# Patient Record
Sex: FEMALE | Race: BLACK OR AFRICAN AMERICAN | Employment: OTHER | URBAN - METROPOLITAN AREA
[De-identification: names, ages, dates, MRNs, and addresses within clinical notes are randomized per-mention and may not be internally consistent; named-entity substitution may affect disease eponyms.]

---

## 2017-01-04 ENCOUNTER — HOSPITAL ENCOUNTER (OUTPATIENT)
Dept: MAMMOGRAPHY | Age: 67
Discharge: HOME | End: 2017-01-04
Attending: OBSTETRICS & GYNECOLOGY
Payer: MEDICARE

## 2017-01-04 DIAGNOSIS — R92.8 ABNORMAL MAMMOGRAM: ICD-10-CM

## 2017-01-04 PROCEDURE — 77065 DX MAMMO INCL CAD UNI: CPT

## 2017-09-14 ENCOUNTER — HOSPITAL ENCOUNTER (OUTPATIENT)
Dept: BONE DENSITY | Age: 67
Discharge: HOME | End: 2017-09-14
Attending: FAMILY MEDICINE
Payer: MEDICARE

## 2017-09-14 DIAGNOSIS — M81.0 OSTEOPOROSIS: ICD-10-CM

## 2017-09-14 PROCEDURE — 77080 DXA BONE DENSITY AXIAL: CPT

## 2017-09-15 ENCOUNTER — OFFICE VISIT (OUTPATIENT)
Dept: SURGERY | Age: 67
End: 2017-09-15

## 2017-09-15 ENCOUNTER — HOSPITAL ENCOUNTER (OUTPATIENT)
Dept: LAB | Age: 67
Discharge: HOME | End: 2017-09-15
Payer: MEDICARE

## 2017-09-15 VITALS
HEIGHT: 61 IN | BODY MASS INDEX: 23.33 KG/M2 | TEMPERATURE: 97.9 F | OXYGEN SATURATION: 96 % | SYSTOLIC BLOOD PRESSURE: 128 MMHG | WEIGHT: 123.6 LBS | HEART RATE: 68 BPM | DIASTOLIC BLOOD PRESSURE: 82 MMHG

## 2017-09-15 DIAGNOSIS — N95.9 MENOPAUSAL AND PERIMENOPAUSAL DISORDER: ICD-10-CM

## 2017-09-15 DIAGNOSIS — N95.2 POSTMENOPAUSAL ATROPHIC VAGINITIS: ICD-10-CM

## 2017-09-15 DIAGNOSIS — Z12.31 ENCOUNTER FOR SCREENING MAMMOGRAM FOR BREAST CANCER: ICD-10-CM

## 2017-09-15 DIAGNOSIS — Z01.419 ENCOUNTER FOR ROUTINE GYNECOLOGICAL EXAMINATION WITH PAPANICOLAOU SMEAR OF CERVIX: Primary | ICD-10-CM

## 2017-09-15 PROCEDURE — 88142 CYTOPATH C/V THIN LAYER: CPT | Performed by: OBSTETRICS & GYNECOLOGY

## 2017-09-15 NOTE — MR AVS SNAPSHOT
Visit Information Date & Time Provider Department Dept. Phone Encounter #  
 9/15/2017 11:15 AM London Garcia, [address] [facility] [address] [id number] Follow-up Instructions Return in about 1 year (around 9/15/2018), or if symptoms worsen or fail to improve. Upcoming Health Maintenance Date Due Hepatitis C Screening 1950 DTaP/Tdap/Td series (1 - Tdap) 12/17/1970 ZOSTER VACCINE AGE 60> 10/17/2009 GLAUCOMA SCREENING Q2Y 12/17/2014 MEDICARE YEARLY EXAM 12/17/2014 FOBT Q 1 YEAR AGE 50-75 9/6/2015 INFLUENZA AGE 9 TO ADULT 8/1/2017 Pneumococcal 65+ Low/Medium Risk (2 of 2 - PPSV23) 8/3/2017 BREAST CANCER SCRN MAMMOGRAM 1/4/2019 Allergies as of 9/15/2017  Review Complete On: 9/15/2017 By: London Garcia MD  
 No Known Allergies Current Immunizations  Reviewed on 8/4/2012 Name Date ZZZ-RETIRED (DO NOT USE) Pneumococcal Vaccine (Unspecified Type) 8/3/2012  5:47 PM  
  
 Not reviewed this visit You Were Diagnosed With   
  
 Codes Comments Encounter for routine gynecological examination with Papanicolaou smear of cervix    -  Primary ICD-10-CM: X22.219 ICD-9-CM: V72.31, V76.2 Menopausal and perimenopausal disorder     ICD-10-CM: N95.9 ICD-9-CM: 627.9 Postmenopausal atrophic vaginitis     ICD-10-CM: N95.2 ICD-9-CM: 627.3 Encounter for screening mammogram for breast cancer     ICD-10-CM: Z12.31 
ICD-9-CM: V76.12 Vitals BP Pulse Temp Height(growth percentile) Weight(growth percentile) SpO2  
 128/82 68 97.9 °F (36.6 °C) (Oral) 5' 1\" (1.549 m) 123 lb 9.6 oz (56.1 kg) 96% BMI OB Status Smoking Status 23.35 kg/m2 Postmenopausal Never Smoker Vitals History BMI and BSA Data Body Mass Index Body Surface Area  
 23.35 kg/m 2 1.55 m 2 Preferred Pharmacy Pharmacy Name Phone  [address] [phone] Your Updated Medication List  
  
   
This list is accurate as of: 9/15/17 12:06 PM.  Always use your most recent med list.  
  
  
  
  
 albuterol 90 mcg/actuation inhaler Commonly known as:  PROVENTIL HFA, VENTOLIN HFA, PROAIR HFA Take 1 Puff by inhalation every four (4) hours as needed. azithromycin 250 mg tablet Commonly known as:  ZITHROMAX Z-JENNIFER  
2 pills on Day #1; 1 pill PO daily on days 2-5.  
  
 benzonatate 100 mg capsule Commonly known as:  TESSALON Take 100 mg by mouth three (3) times daily as needed for Cough. chlorpheniramine-HYDROcodone 10-8 mg/5 mL suspension Commonly known as:  Kaleta Chantale ER Take 5 mL by mouth every twelve (12) hours as needed for Cough. Max Daily Amount: 10 mL. CLARITIN 10 mg tablet Generic drug:  loratadine Take 10 mg by mouth. dextromethorphan-guaiFENesin  mg/5 mL syrup Commonly known as:  ROBITUSSIN-DM Take 10 mL by mouth every six (6) hours as needed for Cough. * fluticasone 50 mcg/actuation nasal spray Commonly known as:  Rosemary Gale * FLOVENT  mcg/actuation inhaler Generic drug:  fluticasone  
  
 hydroCHLOROthiazide 25 mg tablet Commonly known as:  HYDRODIURIL  
  
 LIALDA 1.2 gram delayed release tablet Generic drug:  mesalamine Take 2.4 g by mouth Daily (before breakfast). montelukast 10 mg tablet Commonly known as:  SINGULAIR  
  
 PROzac 20 mg capsule Generic drug:  FLUoxetine Take 20 mg by mouth daily. * Notice: This list has 2 medication(s) that are the same as other medications prescribed for you. Read the directions carefully, and ask your doctor or other care provider to review them with you. We Performed the Following OBTAINING SCREEN PAP SMEAR [ [facility]] PAP, LB, RFX HPV [id number]) [id number] CPT(R)] Follow-up Instructions Return in about 1 year (around 9/15/2018), or if symptoms worsen or fail to improve. To-Do List   
 09/15/2017 Imaging:  [facility] MAMMO BI SCREENING INCL CAD Introducing [facility]! Nayeli Logan introduces myThings patient portal. Now you can access parts of your medical record, email your doctor's office, and request medication refills online. 1. In your internet browser, go to [email or web address] 2. Click on the First Time User? Click Here link in the Sign In box. You will see the New Member Sign Up page. 3. Enter your myThings Access Code exactly as it appears below. You will not need to use this code after youve completed the sign-up process. If you do not sign up before the expiration date, you must request a new code. · myThings Access Code: [email or web address] Expires: 11/22/2017  1:16 PM 
 
4. Enter the last four digits of your Social Security Number (xxxx) and Date of Birth (mm/dd/yyyy) as indicated and click Submit. You will be taken to the next sign-up page. 5. Create a myThings ID. This will be your myThings login ID and cannot be changed, so think of one that is secure and easy to remember. 6. Create a myThings password. You can change your password at any time. 7. Enter your Password Reset Question and Answer. This can be used at a later time if you forget your password. 8. Enter your e-mail address. You will receive e-mail notification when new information is available in [address] 9. Click Sign Up. You can now view and download portions of your medical record. 10. Click the Download Summary menu link to download a portable copy of your medical information. If you have questions, please visit the Frequently Asked Questions section of the myThings website. Remember, myThings is NOT to be used for urgent needs. For medical emergencies, dial 911. Now available from your iPhone and Android! Please provide this summary of care documentation to your next provider. Your primary care clinician is listed as [facility]. If you have any questions after today's visit, please call [phone].

## 2017-09-15 NOTE — PROGRESS NOTES
SUBJECTIVE: Alonzo Sewell is a 78 y.o. female who presents with desire for annual well woman exam. No LMP recorded. Patient is postmenopausal.     No Known Allergies      Past Medical History:   Diagnosis Date    Arthritis     Asthma     Bronchitis     Crohn's colitis ([address]     Gastrointestinal disorder     constipation/hemmorhoids    Hypertension     Ill-defined condition     cerebral palsy    Psychiatric disorder     depression     Past Surgical History:   Procedure Laterality Date    HX ORTHOPAEDIC      R foot surgery    HI COLONOSCOPY W/BIOPSY SINGLE/MULTIPLE  2013         HI COLONOSCOPY W/BIOPSY SINGLE/MULTIPLE  10/9/2014         HI EGD TRANSORAL BIOPSY SINGLE/MULTIPLE  2013          OB History     Grav Para Term  Abortions TAB SAB Ect Mult Living    2 2 1 1 0 0 0 0 0 1        Family History   Problem Relation Age of Onset    Hypertension Mother     Cancer Father      Bone cancer    Hypertension Father     Breast Cancer Maternal Aunt      Social History     Social History    Marital status:      Spouse name: N/A    Number of children: N/A    Years of education: N/A     Occupational History    Not on file. Social History Main Topics    Smoking status: Never Smoker    Smokeless tobacco: Never Used    Alcohol use No      Comment: rare    Drug use: No    Sexual activity: Not Currently     Other Topics Concern    Not on file     Social History Narrative     Current Outpatient Prescriptions   Medication Sig Dispense Refill    loratadine (CLARITIN) 10 mg tablet Take 10 mg by mouth.  benzonatate (TESSALON) 100 mg capsule Take 100 mg by mouth three (3) times daily as needed for Cough.       azithromycin (ZITHROMAX Z-JENNIFER) 250 mg tablet 2 pills on Day #1; 1 pill PO daily on days 2-5. 6 Tab 0    hydroCHLOROthiazide (HYDRODIURIL) 25 mg tablet       montelukast (SINGULAIR) 10 mg tablet       FLOVENT  mcg/actuation inhaler       fluticasone (FLONASE) 50 mcg/actuation nasal spray       chlorpheniramine-HYDROcodone (TUSSIONEX PENNKINETIC ER) 10-8 mg/5 mL suspension Take 5 mL by mouth every twelve (12) hours as needed for Cough. Max Daily Amount: 10 mL. 60 mL 0    albuterol (PROVENTIL HFA, VENTOLIN HFA, PROAIR HFA) 90 mcg/actuation inhaler Take 1 Puff by inhalation every four (4) hours as needed. 1 Inhaler 0    dextromethorphan-guaiFENesin (ROBITUSSIN-DM)  mg/5 mL syrup Take 10 mL by mouth every six (6) hours as needed for Cough. 240 mL 0    Mesalamine (LIALDA) 1.2 gram delayed release tablet Take 2.4 g by mouth Daily (before breakfast).  fluoxetine (PROZAC) 20 mg capsule Take 20 mg by mouth daily. Review of Systems:   Constitutional: No weight change, chills or fever, anorexia, weakness or sleep disturbance . Cardiovascular: No chest pain, shortness of breath, or palpitations . Respiratory: No cough, shortness of breath, hemoptysis, or orthopnea . Neurologic: No syncope, headaches or seizures . Hematologic: No easy bruising or unusual bleeding . Psychiatric: No insomnia, confusion, depression, or anxiety . GI:No nausea and vomiting, diarrhea or constipation  . : See HPI . Musculoskeletal: No joint pain or muscle pain . Endocrine: No polydipsia, polyuria, cold intolerance, excessive fatigue, or sleep disturbance . Integumentary: No breast pain, lumps, nipple discharge, or axillary lumps .     Objective:     Visit Vitals    /82    Pulse 68    Temp 97.9 °F (36.6 °C) (Oral)    Ht 5' 1\" (1.549 m)    Wt 123 lb 9.6 oz (56.1 kg)    SpO2 96%    BMI 23.35 kg/m2       General:  alert, cooperative, no distress, appears stated age   Skin:  no rash or abnormalities   Eyes: negative   Mouth: MMM no lesions   Lymph Nodes:  Cervical, supraclavicular, and axillary nodes normal.   Breast Exam: normal appearance, no masses or tenderness    Lungs:  clear to auscultation bilaterally   Heart:  regular rate and rhythm, S1, S2 normal, no murmur, click, rub or gallop   Abdomen: soft, non-tender. Bowel sounds normal. No masses,  no organomegaly   Back:  Costovertebral angle tenderness absent   Genitourinary: Pelvic exam: VULVA: normal appearing vulva with no masses, tenderness or lesions, VAGINA: normal appearing vagina with normal color and discharge, no lesions, CERVIX: normal appearing cervix without discharge or lesions, UTERUS: uterus is normal size, shape, consistency and nontender, ADNEXA: normal adnexa in size, nontender and no masses    Extremities:  extremities normal, atraumatic, no cyanosis or edema   Neurologic:  negative   Psychiatric:  non focal     ASSESSMENT:      ICD-10-CM ICD-9-CM    1. Encounter for routine gynecological examination with Papanicolaou smear of cervix Z01.419 V72.31 OBTAINING SCREEN PAP SMEAR     V76.2 PAP, LB, RFX HPV CPCLA([id number])   2. Menopausal and perimenopausal disorder N95.9 627.9    3. Postmenopausal atrophic vaginitis N95.2 627.3    4. Encounter for screening mammogram for breast cancer Z12.31 V76.12 [facility] MAMMO BI SCREENING INCL CAD        Follow-up Disposition:  Return in about 1 year (around 9/15/2018), or if symptoms worsen or fail to improve.

## 2017-10-30 ENCOUNTER — HOSPITAL ENCOUNTER (OUTPATIENT)
Dept: CT IMAGING | Age: 67
Discharge: HOME | End: 2017-10-30
Attending: FAMILY MEDICINE
Payer: MEDICARE

## 2017-10-30 DIAGNOSIS — J43.9 EMPHYSEMA LUNG (HCC): ICD-10-CM

## 2017-10-30 PROCEDURE — 71250 CT THORAX DX C-: CPT

## 2018-01-05 ENCOUNTER — HOSPITAL ENCOUNTER (OUTPATIENT)
Dept: MAMMOGRAPHY | Age: 68
Discharge: HOME | End: 2018-01-05
Attending: OBSTETRICS & GYNECOLOGY
Payer: MEDICARE

## 2018-01-05 DIAGNOSIS — Z12.31 ENCOUNTER FOR SCREENING MAMMOGRAM FOR BREAST CANCER: ICD-10-CM

## 2018-01-05 PROCEDURE — 77067 SCR MAMMO BI INCL CAD: CPT

## 2018-09-10 ENCOUNTER — OFFICE VISIT (OUTPATIENT)
Dept: OBGYN CLINIC | Age: 68
End: 2018-09-10

## 2018-09-10 VITALS
WEIGHT: 127 LBS | BODY MASS INDEX: 23.98 KG/M2 | DIASTOLIC BLOOD PRESSURE: 78 MMHG | HEIGHT: 61 IN | SYSTOLIC BLOOD PRESSURE: 120 MMHG

## 2018-09-10 DIAGNOSIS — Z01.419 ENCOUNTER FOR GYNECOLOGICAL EXAMINATION WITHOUT ABNORMAL FINDING: Primary | ICD-10-CM

## 2018-09-10 RX ORDER — PREDNISONE 20 MG/1
TABLET ORAL
COMMUNITY
Start: 2018-08-14 | End: 2019-06-14

## 2018-09-10 NOTE — PROGRESS NOTES
Annual exam    Gi Mercado is a 75 y.o. AAF with pmh of cerebral palsy  A1 presenting for annual exam. Patient without complaint today. Denies PMB. Reports occasional hot flashes, tolerable. Denies issues with vulvovaginal irritation/dryness or other concerns. Denies urinary incontinence. Denies pelvic pain or bloating. Mammogram and colonoscopy wnl and utd. She is taking VitD and Calcium for osteopenia. Pt lives with her  of 25 years. No other concerns today. Ob/Gyn Hx:   A1 - 1 vaginal delivery, 1 SAB  Menopause- unsure  ? VMS- denies  ? Vag dryness- denies  ? HRT- denies  STI- denies  ? SA- no    Health maintenance:  Pap- 17 NILM, no history of abnormal  Mammo- 18 B1  Colonoscopy- , normal repeat in 10 years  Dexa-17, osteopenic    Past Medical History:   Diagnosis Date    Arthritis     Asthma     Bronchitis     Cerebral palsy ([address]     [address]'s [facility] [address] [address]     Gastrointestinal disorder     constipation/hemmorhoids    Hypertension     Ill-defined condition     cerebral palsy    Psychiatric disorder     depression       Past Surgical History:   Procedure Laterality Date    HX ORTHOPAEDIC      R foot surgery    [place] COLONOSCOPY W/BIOPSY SINGLE/MULTIPLE  2013         [place] COLONOSCOPY W/BIOPSY SINGLE/MULTIPLE  10/9/2014         [place] EGD TRANSORAL BIOPSY SINGLE/MULTIPLE  2013            Family History   Problem Relation Age of Onset    Hypertension Mother     Cancer Father      Bone cancer    Hypertension Father     Breast Cancer Maternal Aunt      age 63's       Social History     Social History    Marital status:      Spouse name: N/A    Number of children: N/A    Years of education: N/A     Occupational History    Not on file.      Social History Main Topics    Smoking status: Never Smoker    Smokeless tobacco: Never Used    Alcohol use Yes      Comment: rare    Drug use: No    Sexual activity: Not Currently     Other Topics Concern    Not on file     Social History Narrative       Current Outpatient Prescriptions   Medication Sig Dispense Refill    predniSONE (DELTASONE) 20 mg tablet       hydroCHLOROthiazide (HYDRODIURIL) 25 mg tablet       montelukast (SINGULAIR) 10 mg tablet       Mesalamine (LIALDA) 1.2 gram delayed release tablet Take 2.4 g by mouth Daily (before breakfast).  fluoxetine (PROZAC) 20 mg capsule Take 20 mg by mouth daily.  loratadine (CLARITIN) 10 mg tablet Take 10 mg by mouth.  benzonatate (TESSALON) 100 mg capsule Take 100 mg by mouth three (3) times daily as needed for Cough.  azithromycin (ZITHROMAX Z-JENNIFER) 250 mg tablet 2 pills on Day #1; 1 pill PO daily on days 2-5. 6 Tab 0    FLOVENT  mcg/actuation inhaler       fluticasone (FLONASE) 50 mcg/actuation nasal spray       chlorpheniramine-HYDROcodone (TUSSIONEX PENNKINETIC ER) 10-8 mg/5 mL suspension Take 5 mL by mouth every twelve (12) hours as needed for Cough. Max Daily Amount: 10 mL. 60 mL 0    albuterol (PROVENTIL HFA, VENTOLIN HFA, PROAIR HFA) 90 mcg/actuation inhaler Take 1 Puff by inhalation every four (4) hours as needed. 1 Inhaler 0    dextromethorphan-guaiFENesin (ROBITUSSIN-DM)  mg/5 mL syrup Take 10 mL by mouth every six (6) hours as needed for Cough.  240 mL 0       No Known Allergies    Review of Systems - History obtained from the patient  Constitutional: negative for weight loss, fever, night sweats  HEENT: negative for hearing loss, earache, congestion, snoring, sorethroat  CV: negative for chest pain, palpitations, edema  Resp: negative for cough, shortness of breath, wheezing  GI: negative for change in bowel habits, abdominal pain, black or bloody stools  : negative for frequency, dysuria, hematuria, vaginal discharge  MSK: negative for back pain, joint pain, muscle pain  Breast: negative for breast lumps, nipple discharge, galactorrhea  Skin :negative for itching, rash, hives  Neuro: negative for dizziness, headache, confusion, weakness  Psych: negative for anxiety, depression, change in mood  Heme/lymph: negative for bleeding, bruising, pallor    Physical Exam  Visit Vitals    /78 (BP 1 Location: Left arm, BP Patient Position: Sitting)    Ht 5' 1\" (1.549 m)    Wt 127 lb (57.6 kg)    BMI 24 kg/m2     Constitutional  · Appearance: well-nourished, well developed, alert, in no acute distress, pleasant, ambulates without assistance    HENT  · Head and Face: appears normal, +uses hearing aid    Neck  · Inspection/Palpation: normal appearance, no masses or tenderness  · Lymph Nodes: no lymphadenopathy present  · Thyroid: gland size normal, nontender, no nodules or masses present on palpation    Chest  · Respiratory Effort: non-labored breathing  · Auscultation: CTAB, normal breath sounds    Cardiovascular  · Heart:  · Auscultation: regular rate and rhythm without murmur  · Extremities: no peripheral edema    Breasts  · Inspection of Breasts: breasts symmetrical, no skin changes, no discharge present, nipple appearance normal, no skin retraction present  · Palpation of Breasts and Axillae: no masses present on palpation, no breast tenderness  · Axillary Lymph Nodes: no lymphadenopathy present    Gastrointestinal  · Abdominal Examination: abdomen non-tender to palpation, normal bowel sounds, no masses present  · Liver and spleen: no hepatomegaly present, spleen not palpable  · Hernias: no hernias identified    Genitourinary  · External Genitalia: normal appearance for age, no discharge present, no tenderness present, no inflammatory lesions present, no masses present, +atrophy of UG mucosa  · Vagina: normal vaginal vault without central or paravaginal defects, no discharge present, no inflammatory lesions present, no masses present  · Bladder: non-tender to palpation  · Urethra: appears normal  · Cervix: normal   · Uterus: normal size, shape and consistency, small, mobile  · Adnexa: no adnexal tenderness present, no appreciable adnexal masses present --> exam somewhat limited by habitus  · Perineum: perineum within normal limits, no evidence of trauma, no rashes or skin lesions present    Skin  · General Inspection: no rash, no lesions identified    Neurologic/Psychiatric  · Mental Status:  · Orientation: grossly oriented to person, place and time  · Mood and Affect: mood normal, affect appropriate      Assessment/Plan:  75 y.o. postmenopausal female presenting for annual exam. Overall doing well.      Health Maintenance:  -counseled re: diet, exercise, healthy lifestyle  -pap/HPV no longer indicated d/t age and h/o normal paps  -repeat mammo in 1 year  -colonoscopy wnl and utd  -dexa showing osteopenia --> cont calcium and vitD and weight bearing exercise    RTC: 1 year for AE or sooner torres Petit MD  9/10/2018  10:31 AM

## 2018-09-10 NOTE — PATIENT INSTRUCTIONS
Pelvic Exam: Care Instructions  Your Care Instructions    When your doctor examines all of your pelvic organs, it's called a pelvic exam. Two good reasons to have this kind of exam are to check for sexually transmitted infections (STIs) and to get a Pap test. A Pap test is also called a Pap smear. It checks for early changes that can lead to cancer of the cervix. Sometimes a pelvic exam is part of a regular checkup. In this case, you can do some things to make your test results as accurate as possible. · Try to schedule the exam when you don't have your period. · Don't use douches, tampons, or vaginal medicines, sprays, or powders for 24 hours before your exam.  · Don't have sex for 24 hours before your exam.  Other times, women have this kind of exam at any time of the month. This is because they have pelvic pain, bleeding, or discharge. Or they may have another pelvic problem. Before your exam, it's important to share some information with your doctor. For example, if you are a survivor of rape or sexual abuse, you can talk about any concerns you may have. Your doctor will also want to know if you are pregnant or use birth control. And he or she will want to hear about any problems, surgeries, or procedures you have had in your pelvic area. You will also need to tell your doctor when your last period was. Follow-up care is a key part of your treatment and safety. Be sure to make and go to all appointments, and call your doctor if you are having problems. It's also a good idea to know your test results and keep a list of the medicines you take. How is a pelvic exam done? · During a pelvic exam, you will:  ¨ Take off your clothes below the waist. You will get a paper or cloth cover to put over the lower half of your body. Mar Able on your back on an exam table. Your feet will be raised above you. Stirrups will support your feet. · The doctor will:  Chichi Aid you to relax your knees.  Your knees need to lean out, toward the walls. ¨ Check the opening of your vagina for sores or swelling. ¨ Gently put a tool called a speculum into your vagina. It opens the vagina a little bit. You will feel some pressure. But if you are relaxed, it will not hurt. It lets your doctor see inside the vagina. ¨ Use a small brush, spatula, or swab to get a sample of cells, if you are having a Pap test or culture. The doctor then removes the speculum. ¨ Put on gloves and put one or two fingers of one hand into your vagina. The other hand goes on your lower belly. This lets your doctor feel your pelvic organs. You will probably feel some pressure. Try to stay relaxed. ¨ Put one gloved finger into your rectum and one into your vagina, if needed. This can also help check your pelvic organs. This exam takes about 10 minutes. At the end, you will get a washcloth or tissue to clean your vaginal area. It's normal to have some discharge after this exam. You can then get dressed. Some test results may be ready right away. But results from a culture or a Pap test may take several days or a few weeks. Why should you have a pelvic exam?  · You want to have recommended screening tests. This includes a Pap test.  · You think you have a vaginal infection. Signs include itching, burning, or unusual discharge. · You might have been exposed to a sexually transmitted infection (STI), such as chlamydia or herpes. · You have vaginal bleeding that is not part of your normal menstrual period. · You have pain in your belly or pelvis. · You have been sexually assaulted. A pelvic exam lets your doctor collect evidence and check for STIs. · You are pregnant. · You are having trouble getting pregnant. What are the risks of a pelvic exam?  There are no risks from a pelvic exam.  When should you call for help? Watch closely for changes in your health, and be sure to contact your doctor if you have any problems. Where can you learn more?   Go to http://zainab-sherry.info/. Enter D138 in the search box to learn more about \"Pelvic Exam: Care Instructions. \"  Current as of: October 6, 2017  Content Version: 11.7  © 4501-6427 [employer], [employer]. Care instructions adapted under license by [employer] (which disclaims liability or warranty for this information). If you have questions about a medical condition or this instruction, always ask your healthcare professional. [place] [address] any warranty or liability for your use of this information.

## 2019-01-07 ENCOUNTER — HOSPITAL ENCOUNTER (OUTPATIENT)
Dept: MAMMOGRAPHY | Age: 69
Discharge: HOME | End: 2019-01-07
Attending: FAMILY MEDICINE
Payer: MEDICARE

## 2019-01-07 DIAGNOSIS — Z12.39 ENCOUNTER FOR SCREENING BREAST EXAMINATION: ICD-10-CM

## 2019-01-07 PROCEDURE — 77067 SCR MAMMO BI INCL CAD: CPT

## 2019-06-14 ENCOUNTER — HOSPITAL ENCOUNTER (EMERGENCY)
Age: 69
Discharge: HOME | End: 2019-06-14
Attending: EMERGENCY MEDICINE
Payer: MEDICARE

## 2019-06-14 ENCOUNTER — APPOINTMENT (OUTPATIENT)
Dept: GENERAL RADIOLOGY | Age: 69
End: 2019-06-14
Attending: EMERGENCY MEDICINE
Payer: MEDICARE

## 2019-06-14 VITALS
TEMPERATURE: 97.6 F | RESPIRATION RATE: 20 BRPM | WEIGHT: 125.22 LBS | SYSTOLIC BLOOD PRESSURE: 152 MMHG | BODY MASS INDEX: 23.66 KG/M2 | DIASTOLIC BLOOD PRESSURE: 86 MMHG | HEART RATE: 94 BPM | OXYGEN SATURATION: 96 %

## 2019-06-14 DIAGNOSIS — M54.12 CERVICAL RADICULOPATHY: Primary | ICD-10-CM

## 2019-06-14 PROCEDURE — 99283 EMERGENCY DEPT VISIT LOW MDM: CPT

## 2019-06-14 PROCEDURE — 74011250637 HC RX REV CODE- 250/637: Performed by: EMERGENCY MEDICINE

## 2019-06-14 PROCEDURE — 72050 X-RAY EXAM NECK SPINE 4/5VWS: CPT

## 2019-06-14 PROCEDURE — 74011636637 HC RX REV CODE- 636/637: Performed by: EMERGENCY MEDICINE

## 2019-06-14 RX ORDER — IBUPROFEN 600 MG/1
TABLET ORAL
Status: DISCONTINUED
Start: 2019-06-14 | End: 2019-06-14 | Stop reason: HOSPADM

## 2019-06-14 RX ORDER — PREDNISONE 20 MG/1
TABLET ORAL
Status: DISCONTINUED
Start: 2019-06-14 | End: 2019-06-14 | Stop reason: HOSPADM

## 2019-06-14 RX ORDER — IBUPROFEN 400 MG/1
400 TABLET ORAL
Qty: 30 TAB | Refills: 0 | Status: SHIPPED | OUTPATIENT
Start: 2019-06-14

## 2019-06-14 RX ORDER — PREDNISONE 20 MG/1
60 TABLET ORAL
Status: COMPLETED | OUTPATIENT
Start: 2019-06-14 | End: 2019-06-14

## 2019-06-14 RX ORDER — IBUPROFEN 600 MG/1
600 TABLET ORAL
Status: COMPLETED | OUTPATIENT
Start: 2019-06-14 | End: 2019-06-14

## 2019-06-14 RX ORDER — PREDNISONE 5 MG/1
TABLET ORAL
Qty: 21 TAB | Refills: 0 | Status: SHIPPED | OUTPATIENT
Start: 2019-06-14 | End: 2021-02-18 | Stop reason: CLARIF

## 2019-06-14 RX ADMIN — PREDNISONE 60 MG: 20 TABLET ORAL at 01:59

## 2019-06-14 RX ADMIN — IBUPROFEN 600 MG: 600 TABLET, FILM COATED ORAL at 01:59

## 2019-06-14 NOTE — ED NOTES
0244: Patient verbalizes understanding of discharge instructions given by provider, Dr. Marbin Lee MD. Opportunity for questions provided. Patient in no apparent distress. Patient ambulatory upon discharge.    Visit Vitals  /86 (BP 1 Location: Left arm, BP Patient Position: Sitting)   Pulse 94   Temp 97.6 °F (36.4 °C)   Resp 20   Wt 56.8 kg (125 lb 3.5 oz)   SpO2 96%   BMI 23.66 kg/m²

## 2019-06-14 NOTE — DISCHARGE INSTRUCTIONS
Patient Education        Pinched Nerve in the Neck: Care Instructions  Your Care Instructions  A pinched nerve in the neck happens when a vertebra or disc in the upper part of your spine is damaged. This damage can happen because of an injury. Or it can just happen with age. The changes caused by the damage may put pressure on a nearby nerve root, pinching it. This causes symptoms such as sharp pain in your neck, shoulder, arm, hand, or back. You may also have tingling or numbness. Sometimes it makes your arm weaker. The symptoms are usually worse when you turn your head or strain your neck. For many people, the symptoms get better over time and finally go away. Early treatment usually includes medicines for pain and swelling. Sometimes physical therapy and special exercises may help. Follow-up care is a key part of your treatment and safety. Be sure to make and go to all appointments, and call your doctor if you are having problems. It's also a good idea to know your test results and keep a list of the medicines you take. How can you care for yourself at home? · Be safe with medicines. Read and follow all instructions on the label. ¨ If the doctor gave you a prescription medicine for pain, take it as prescribed. ¨ If you are not taking a prescription pain medicine, ask your doctor if you can take an over-the-counter medicine. · Try using a heating pad on a low or medium setting for 15 to 20 minutes every 2 or 3 hours. Try a warm shower in place of one session with the heating pad. You can also buy single-use heat wraps that last up to 8 hours. · You can also try an ice pack for 10 to 15 minutes every 2 to 3 hours. There isn't strong evidence that either heat or ice will help. But you can try them to see if they help you. · Don't spend too long in one position. Take short breaks to move around and change positions. · Wear a seat belt and shoulder harness when you are in a car.   · Sleep with a pillow under your head and neck that keeps your neck straight. · If you were given a neck brace (cervical collar) to limit neck motion, wear it as instructed for as many days as your doctor tells you to. Do not wear it longer than you were told to. Wearing a brace for too long can lead to neck stiffness and can weaken the neck muscles. · Follow your doctor's instructions for gentle neck-stretching exercises. · Do not smoke. Smoking can slow healing of your discs. If you need help quitting, talk to your doctor about stop-smoking programs and medicines. These can increase your chances of quitting for good. · Avoid strenuous work or exercise until your doctor says it is okay. When should you call for help? Call 911 anytime you think you may need emergency care. For example, call if:  ? · You are unable to move an arm or a leg at all. ?Call your doctor now or seek immediate medical care if:  ? · You have new or worse symptoms in your arms, legs, chest, belly, or buttocks. Symptoms may include:  ¨ Numbness or tingling. ¨ Weakness. ¨ Pain. ? · You lose bladder or bowel control. ? Watch closely for changes in your health, and be sure to contact your doctor if:  ? · You are not getting better as expected. Where can you learn more? Go to http://zainab-sherry.info/. Enter D198 in the search box to learn more about \"Pinched Nerve in the Neck: Care Instructions. \"  Current as of: March 21, 2017  Content Version: 11.5  © 6801-0213 [employer]. Care instructions adapted under license by [employer] (which disclaims liability or warranty for this information). If you have questions about a medical condition or this instruction, always ask your healthcare professional. [address] any warranty or liability for your use of this information.

## 2019-06-14 NOTE — ED TRIAGE NOTES
She has severe pain right arm and shoulder and neck. She has had the pain for several weeks. She was given an injection about 1 week ago that helped briefly. She says the pain is so bad now she cant stand it. She is scheduled for an MRI next week.

## 2019-06-14 NOTE — ED PROVIDER NOTES
70 y.o. female with past medical history significant for HTN, constipation/hemmorhoids, depression, arthritis, chron's colitis, cerebral palsy, bronchitis, and asthma who presents from home via EMS with chief complaint of right sided arm pain. Pt reports right sided arm pain which she describes as \"aching like a toothache. \" Pt reports the pain originates in the right side of her neck and radiates down her right arm. Pt denies any chest pain with and without exertion. Pt denies fever, chills, and chest pain. There are no other acute medical concerns at this time. Social hx:   PCP: Justine Jackson DO    Note written by liset Verma, as dictated by Ya Carrasco MD 1:27 AM      The history is provided by the patient and the spouse. No  was used.         Past Medical History:   Diagnosis Date    Arthritis     Asthma     Bronchitis     Cerebral palsy (HCC)     Crohn's colitis ([facility] [address]     Gastrointestinal disorder     constipation/hemmorhoids    Hypertension     Ill-defined condition     cerebral palsy    Psychiatric disorder     depression       Past Surgical History:   Procedure Laterality Date    HX ORTHOPAEDIC      R foot surgery    [place] COLONOSCOPY W/BIOPSY SINGLE/MULTIPLE  2/22/2013         KS COLONOSCOPY W/BIOPSY SINGLE/MULTIPLE  10/9/2014         KS EGD TRANSORAL BIOPSY SINGLE/MULTIPLE  2/22/2013              Family History:   Problem Relation Age of Onset    Hypertension Mother     Cancer Father         Bone cancer    Hypertension Father     Breast Cancer Maternal Aunt         age 63's   [address] [facility] Breast Cancer Cousin         52's       Social History     Socioeconomic History    Marital status:      Spouse name: Not on file    Number of children: Not on file    Years of education: Not on file    Highest education level: Not on file   Occupational History    Not on file   Social Needs    Financial resource strain: Not on file    Food insecurity:     Worry: Not on file     Inability: Not on file    Transportation needs:     Medical: Not on file     Non-medical: Not on file   Tobacco Use    Smoking status: Never Smoker    Smokeless tobacco: Never Used   Substance and Sexual Activity    Alcohol use: Yes     Comment: rare    Drug use: No    Sexual activity: Not Currently   Lifestyle    Physical activity:     Days per week: Not on file     Minutes per session: Not on file    Stress: Not on file   Relationships    Social connections:     Talks on phone: Not on file     Gets together: Not on file     Attends Yazdanism service: Not on file     Active member of club or organization: Not on file     Attends meetings of clubs or organizations: Not on file     Relationship status: Not on file    Intimate partner violence:     Fear of current or ex partner: Not on file     Emotionally abused: Not on file     Physically abused: Not on file     Forced sexual activity: Not on file   Other Topics Concern    Not on file   Social History Narrative    Not on file         ALLERGIES: Patient has no known allergies. Review of Systems   Constitutional: Negative for chills and fever. Eyes: Negative for visual disturbance. Respiratory: Negative for shortness of breath. Cardiovascular: Negative for chest pain. Gastrointestinal: Negative for abdominal pain, diarrhea, nausea and vomiting. Genitourinary: Negative for dysuria. Musculoskeletal: Positive for myalgias (right sided arm pain) and neck pain (right sided). Vitals:    06/14/19 0111   BP: 152/86   Pulse: 94   Resp: 20   Temp: 97.6 °F (36.4 °C)   SpO2: 96%   Weight: 56.8 kg (125 lb 3.5 oz)            Physical Exam   Constitutional: She is oriented to person, place, and time. She appears well-developed and well-nourished. No distress. NAD, AxOx4, speaking in complete sentences     HENT:   Head: Normocephalic and atraumatic.    Mouth/Throat: Oropharynx is clear and moist.   bilat hearing aides   Eyes: Pupils are equal, round, and reactive to light. Conjunctivae are normal. Right eye exhibits no discharge. No scleral icterus. Neck: Normal range of motion. Neck supple. No JVD present. No tracheal deviation present. Cardiovascular: Normal rate, regular rhythm, normal heart sounds and intact distal pulses. Exam reveals no gallop and no friction rub. No murmur heard. Pulmonary/Chest: Effort normal and breath sounds normal. No respiratory distress. She has no wheezes. She has no rales. She exhibits no tenderness. Abdominal: Soft. Bowel sounds are normal. There is no tenderness. There is no rebound and no guarding. Genitourinary: No vaginal discharge found. Musculoskeletal: Normal range of motion. She exhibits no edema or tenderness. R paraspinal muscular/ ttp;  R = L; pt has distal motor/ CV/ Sensation grossly intact R fingers; Neurological: She is alert and oriented to person, place, and time. She displays normal reflexes. No cranial nerve deficit or sensory deficit. She exhibits normal muscle tone. Coordination normal.   Skin: Skin is warm and dry. Capillary refill takes less than 2 seconds. No rash noted. No erythema. No pallor. Psychiatric: She has a normal mood and affect. Her behavior is normal. Thought content normal.   Nursing note and vitals reviewed. MDM       Procedures      2:20 AM  Ulises Osullivan Anthony's  results have been reviewed with her. She has been counseled regarding her diagnosis. She verbally conveys understanding and agreement of the signs, symptoms, diagnosis, treatment and prognosis and additionally agrees to Call/ Arrange follow up as recommended with Dr. Dilshad Calle DO in 24 - 48 hours. She also agrees with the care-plan and conveys that all of her questions have been answered.   I have also put together some discharge instructions for her that include: 1) educational information regarding their diagnosis, 2) how to care for their diagnosis at home, as well a 3) list of reasons why they would want to return to the ED prior to their follow-up appointment, should their condition change or for concerns.

## 2019-06-20 ENCOUNTER — HOSPITAL ENCOUNTER (OUTPATIENT)
Dept: MRI IMAGING | Age: 69
Discharge: HOME | End: 2019-06-20
Attending: NEUROLOGICAL SURGERY
Payer: MEDICARE

## 2019-06-20 VITALS — BODY MASS INDEX: 22.67 KG/M2 | WEIGHT: 120 LBS

## 2019-06-20 DIAGNOSIS — M54.12 CERVICAL RADICULOPATHY: ICD-10-CM

## 2019-06-20 PROCEDURE — A9575 INJ GADOTERATE MEGLUMI 0.1ML: HCPCS | Performed by: NEUROLOGICAL SURGERY

## 2019-06-20 PROCEDURE — 72156 MRI NECK SPINE W/O & W/DYE: CPT

## 2019-06-20 PROCEDURE — 74011250636 HC RX REV CODE- 250/636: Performed by: NEUROLOGICAL SURGERY

## 2019-06-20 RX ORDER — GADOTERATE MEGLUMINE 376.9 MG/ML
10 INJECTION INTRAVENOUS
Status: COMPLETED | OUTPATIENT
Start: 2019-06-20 | End: 2019-06-20

## 2019-06-20 RX ADMIN — GADOTERATE MEGLUMINE 10 ML: 376.9 INJECTION INTRAVENOUS at 19:04

## 2019-08-16 ENCOUNTER — TELEPHONE (OUTPATIENT)
Dept: RHEUMATOLOGY | Age: 69
End: 2019-08-16

## 2019-08-16 NOTE — TELEPHONE ENCOUNTER
Return call back to patient requesting if we had received referral information for her upcoming npt appt on 9/4/2019 at 1:00. Explain to patient we had not received anything she may want to reach out to her pcp office.  sdh

## 2019-09-03 ENCOUNTER — TELEPHONE (OUTPATIENT)
Dept: RHEUMATOLOGY | Age: 69
End: 2019-09-03

## 2019-09-03 NOTE — TELEPHONE ENCOUNTER
I called and but could not confirm with the patient on 9/3/2019 for their next day 9/4/2019 appointment but left a voice message to call back. SDH.

## 2019-09-04 ENCOUNTER — OFFICE VISIT (OUTPATIENT)
Dept: RHEUMATOLOGY | Age: 69
End: 2019-09-04

## 2019-09-04 VITALS
DIASTOLIC BLOOD PRESSURE: 90 MMHG | TEMPERATURE: 97.7 F | HEIGHT: 61 IN | WEIGHT: 119 LBS | BODY MASS INDEX: 22.47 KG/M2 | RESPIRATION RATE: 18 BRPM | HEART RATE: 92 BPM | SYSTOLIC BLOOD PRESSURE: 150 MMHG

## 2019-09-04 DIAGNOSIS — F09 COGNITIVE DYSFUNCTION: ICD-10-CM

## 2019-09-04 DIAGNOSIS — M54.2 CHRONIC NECK PAIN: ICD-10-CM

## 2019-09-04 DIAGNOSIS — K52.9 COLITIS: ICD-10-CM

## 2019-09-04 DIAGNOSIS — M25.431 WRIST SWELLING, RIGHT: Primary | ICD-10-CM

## 2019-09-04 DIAGNOSIS — G89.29 CHRONIC NECK PAIN: ICD-10-CM

## 2019-09-04 NOTE — PROGRESS NOTES
REASON FOR VISIT    This is the initial evaluation for Ms. Art Mcmullen a 70 y.o.  female for question of an inflammatory arthritis. The patient is referred to the [facility] at the request of Dr. Juancarlos Valdez. HISTORY OF PRESENT ILLNESS      I have reviewed and summarized old records from [facility], [facility] records, [facility]. In 2/22/2013, Midesophagus, biopsy: Normal squamous epithelium; fungal stain negative 2. Left colon, biopsy: Moderate chronic active colitis. Histologic features suggest inflammatory bowel disease. There is abundant detached purulent exudate but not the classic mucopurulent exudate or mucosal necrosis of pseudomembranous colitis due to C. Difficile. In 10/09/2014, Sigmoid colon, biopsy: Mild chronic active colitis    In 5/19/2015, MRI Cervical Spine with and without contrast showed There is reversal of curvature centered at C5. No significant listhesis. There is erosive change of the odontoid process. Mild edema is present within the odontoid process with slight postcontrast enhancement. There is extensive pannus formation along the posterior aspect of the odontoid process. This creates severe spinal stenosis with the C1 ring. The spinal cord is mildly narrowed in this region with increased signal likely related to chronic myelomalacia. There is degenerative wedging of the C3-C7 vertebral bodies. There is mild degenerative edema along the endplates. Mild disc space narrowing is present at C2-3. Moderate disc space narrowing is present from C3-C7. Moderate disc space narrowing is in the superior thoracic spine. Theremainder of the spinal cord is unremarkable. There is no enhancement within the cord. C2-C3:  No herniation or stenosis. C3-C4:  Minimal broad-based disc osteophyte complex without significant spinal stenosis. There is moderate left and mild right neural frontal narrowing submitted to uncal hypertrophy.  C4-C5:  Mild broad-based disc osteophyte complex with thickening of ligamentum flavum causing moderate spinal stenosis. There is mild bilateral neural foraminal narrowing secondary to uncal and facet hypertrophy. C5-C6:  Mild broad-based disc osteophyte complex with thickening of ligamentum flavum causing moderate spinal stenosis. There is moderate bilateral neural foraminal narrowing secondary to uncal hypertrophy. C6-C7:  Mild broad-based discussed you by complex with thickening of ligamentum flavum causing moderate spinal stenosis. There is moderate bilateral neural foraminal narrowing secondary to uncal hypertrophy. C7-T1:  No significant spinal stenosis. Moderate left and right neural foraminal narrowing. Mild broad-based disc osteophyte complexes are present from T1-T4 causing mild spinal stenosis. There is moderate bilateral neural foraminal narrowing at these levels as well. In 12/8/2015, EMG/NCS showed severe right carpal tunnel syndrome affecting motor and sensory fibers. Left carpal tunnel syndrome with severe sensory involvement and moderate motor involvement. 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 No evidence of radiculopathies involving the left upper and lower extremity motor axons. No evidence of myositis or myopathy. In 5/20/2019, CT Cervical Spine without contrast showed The base of the dens there are well circumscribed erosive changes. Additional similar findings are noted within the lateral mass of L1 on the left. There is no significant calcification within the prominent soft tissue posterior to the dens. Bone mineral density is normal. There is extensive multilevel degenerative change. An acute fracture is not identified.  However again demonstrated is significant narrowing of the canal posterior to the dens predominantly due to the hypertrophied soft tissue     In 10/26/2015, she saw my colleague, Dr. Mae Vidales, rheumatology. Bilateral Hand radiograph showed moderate diffuse osteopenia. There is mild osteoarthrosis of the first carpometacarpal joints bilaterally as well as the first and fifth metacarpophalangeal joints bilaterally. Similar findings are shown in the right thumb interphalangeal joint and the right third through fifth and left second through fifth distal interphalangeal joints. No osseous erosions or soft tissue calcifications are shown. There is mild nonspecific flexion of the right fourth and fifth proximal and distal interphalangeal joints. The soft tissues are within normal limits. Bilateral Hip radiograph showed numerous oval-shaped flocculent densities overlying the left hip and proximal thigh, each measuring approximately 4.0 x 2.3 cm in size. These are not shown on previous radiographs and probably extrinsic to the patient. This should be confirmed clinically with repeat views obtained without these artifacts present. The bones are moderately osteopenic. No acute fracture or dislocation is shown. Mild to moderate degenerative changes in the lower lumbar spine are shown as are minimal degenerative changes in the SI joints and hips bilaterally. No osseous erosions are shown. Bilateral Foot radiograph showed moderate diffuse osteopenia. No other bone, joint or soft tissue abnormality is shown. .  There is no effusion. Bilateral Foot radiograph showed moderate diffuse osteopenia. There is deformity of the first metatarsals bilaterally with valgus of the distal portion of the metatarsal on the right. The deformities probably postoperative nature and should be correlated clinically. Severe osteoarthrosis of the first metatarsophalangeal joint on the right is shown with medial subluxation of the proximal phalanx as well as mild varus of the digit. 20 is greater laterally and there is attrition of bone substance at the lateral base of the first proximal phalanx.  There is mild osteoarthrosis of the contralateral left first MTP joint. There have been arthroplasties of the right second and fifth proximal interphalangeal joint and probably of the left fifth middle interphalangeal joint. Mild nonuniform joint space narrowing consistent with osteoarthrosis is shown at the right fifth MTP joint, right first IP joint, and several digital interphalangeal joints bilaterally. There is no substantial midfoot arthrosis nor substantial hydronephrosis demonstrated. Hindfoot and midfoot alignment is anatomic. There are tiny plantar and dorsal calcaneal spurs bilaterally. In 10/26/2015, labs showed VANESSA 1:1280 (homogenous), negative rheumatoid factor, anti-CCP    In 11/13/2015, labs showed positive anti-histone 5.3, negative anti-RNP, anti-dsDNA Ab, anti-Sm, anti-Ro, anti-La, anti-TPO, anti-thyroglobulin, myositis panel, normal C3, C4    In 12/16/2015, Thoracic Spine radiograph showed no acute fracture or compression deformity. Subtle leftward curvature of the midthoracic spine. Subtle rightward curvature of the upper and lower thoracic spine. No anterior or posterior subluxation. Degenerative disc disease is moderate in the upper thoracic spine. Severe degenerative disc disease in the lower cervical spine. No evidence of ankylosis. Lumbar Spine radiograph showed Subtle rightward curvature of the upper lumbar spine. No anterior or posterior subluxation. Degenerative disc disease is greatest at L5-S1. Facet arthrosis is moderate. No acute fracture or compression deformity. No ankylosis. Sacroiliac joints are age-appropriate. Sacroiliac Joint radiograph showed No acute fracture or dislocation. Minimal age-appropriate bilateral sacroiliac joint osteoarthritis. No evidence of erosion. Degenerative disease in the lumbar spine is partially imaged.     In 12/22/2015, Cervical Spine radiograph showed The patient has undergone posterior fixation  at C1-2, with a screw within the right C1 lateral mass and within the lateral masses of C2 bilaterally. There may have been surgical removal of the C1 posterior arch. Flexion and extension views demonstrate no malalignment or instability. Diffuse degenerative changes are present throughout the cervical spine, with multilevel spondylosis and facet hypertrophy. In 1/08/2016, CT Cervical Spine without contrast showed patient is post resection of posterior arch of C1. There has been posterior fusion of C1 and C2. C1 lateral mass screw extends into the superior margin of the right C1-C2 foramen. The bilateral C2 screws traverse the C1-C2  articulation. There is bone graft material posterior laterally on the right. This is fused with the posterior arch of C2. It does not fuse with the posterior arch of C1. There is persistent hypertrophy of the soft tissues posterior to the dens, it may have decreased slightly. Degeneration of the remaining cervical disc levels unchanged most significant at C5-6 and C6-7. No acute fracture. In 1/21/2016, MRI Pelvis with and without contrast showed There is extensively demonstrated abnormal signal within the sacral wings bilaterally with enhancement and vertically oriented fracture lines. A transverse component of abnormal signal is also shown at the S2 vertebral segment. The findings are consistent with sacral insufficiency fracture. There is otherwise normal bone signal and enhancement. Minimal osteoarthrosis of both sacroiliac joints is shown without demonstration of joint effusion, erosion, or joint space-centered abnormal enhancement. Minimal bilateral SI joint capsular enhancement is noted, however. There is no bone or soft tissue mass. Muscle contour, signal, morphology and enhancement appear normal. No tendon disruption is shown. In 3/25/2016, labs showed positive VANESSA 1:1280 (homogenous), anti-histone 7.2, negative HLA-B27    In 4/25/2016, MRI Cervical Spine without contrast showed New hardware within C1 and C2 causes metal artifact.  Decreased soft tissue pannus dorsal to the odontoid process. Resection of the posterior arch of C1 since last year. Posterior subluxation of C5-6 and C6-7 is unchanged. Chronic volume loss of C5 and C6 is unchanged. No acute compression fracture. Bone marrow edema in the odontoid process is unchanged. Disc desiccation is unchanged. No evidence of discitis. The craniocervical junction is now within normal limits. No cord compression at C1 or C2 anymore. Mild cord compression at C5-C6 is unchanged. No cord signal abnormality within the limitation of motion artifact. No cord expansion or syrinx. The paraspinal soft tissues are within normal limits. C2-C3:  No herniation or stenosis. C3-C4: Limited by motion artifact. Bilateral foraminal stenosis is likely unchanged. C4-C5:  Limited by motion artifact. Mild central spinal canal stenosis is unchanged. Right foraminal stenosis is at least moderate. C5-C6:  Posterior disc osteophyte complex. Moderate central spinal canal stenosis. At least moderate bilateral foraminal stenosis. No significant change. C6-C7: Posterior disc osteophyte complex and facet arthrosis. Moderate central spinal canal stenosis. At least moderate bilateral foraminal stenosis. No significant change. C7-T1:  No herniation or stenosis. Thoracic spine degenerative disease and multilevel stenoses are unchanged. In 9/01/2016, CT Spine Cervical without contrast The patient has undergone prior resection of the posterior arch of C1 as well as posterior fusion at C1-2. Hardware position is unchanged and hardware is intact. Extension of the right C1 lateral mass screw through the superior aspect of the C1-2 foramen is unchanged. There is unchanged bone graft fusion to the posterior arch of C2 but not to C1. Hypertrophic changes along the dorsal aspect of the odontoid process are unchanged. The kyphotic deformity of the cervical spine is unchanged. There is diffuse sclerosis of the C6 vertebra which is likely degenerative in nature. Chronic loss of height of C5-C7 is unchanged. The prevertebral soft tissues are within normal limits. C2-C3: There is no spinal stenosis. C3-C4:  There is bilateral facet hypertrophy, right greater than left, with mild right foraminal stenosis. There is no central spinal stenosis. C4-C5:  There is marked right facet hypertrophy with right foraminal stenosis. There is no moderate central stenosis. The spinal canal was previously better evaluated on MRI. C5-C6:  There is mild posterior disc osteophyte complex and bilateral uncovertebral joint hypertrophy with moderate central stenosis. There is mild bilateral foraminal stenosis. C6-C7:  There is a mild posterior disc osteophyte complex and bilateral uncovertebral joint hypertrophy. There is moderate central spinal stenosis, better demonstrated on a previous MRI. Suezanne Kassandra There is at least mild bilateral foraminal stenosis. Cystic change within the posterior aspect of C6 is likely degenerative. C7-T1:  There is no spinal canal or neural foraminal stenosis. In 9/14/2017, DXA showed (excluded L3 and L4 due to spondylosis, right hip) lumbar spine L1-L2 T score -2.4 (BMD 0.808 g/cm2), left femoral neck T score: -2.1 (0.650 g/cm2), left total hip T score: -1.8 (0.756 g/cm2), and distal one third left radius T score -1.9 (BMD 0.572 g/cm2). FRAX score 7.0 % probability in 10 years for major osteoporotic fracture and 1.1 % 10 year probability of hip fracture. In 10/30/2017, CT Chest without contrast showed THYROID: No nodule. MEDIASTINUM: No mass or lymphadenopathy. GEORGINA: No mass or lymphadenopathy. THORACIC AORTA: No aneurysm. MAIN PULMONARY ARTERY: Normal in caliber. TRACHEA/BRONCHI: Patent. ESOPHAGUS: No wall thickening or dilatation. HEART: Normal in size. PLEURA: No effusion or pneumothorax. LUNGS: No nodule, mass, or airspace disease. Study is slightly compromised by motion. There is question of a 2 mm right lower lobe pulmonary nodule.  INCIDENTALLY IMAGED UPPER ABDOMEN: No focal abnormality. BONES: No destructive bone lesion. There are degenerative changes lower cervical spine. There are mild degenerative changes upper and mid thoracic spine. In 6/20/2019, MRI Cervical Spine with and without contrast showed There is accentuated reversal of the normal lordosis of the cervical spine with fairly marked kyphosis at the level of C5-6. No subluxation of vertebral bodies noted. There is marked progression of degenerative disc disease changes and spondylosis at C5-6. There is no loss of vertebral body height. Marrow signal intensity at C5 and C6 demonstrates progressive degenerative changes. There is no evidence of discitis or osteomyelitis. The craniocervical junction is intact. Note is made of patient's history of previous posterior laminectomy at C1-2. Fixation screws are seen bilaterally. This is similar to the previous study. There is no encroachment on the neural canal at this level. No abnormal enhancement is identified at this level. Motion artifact does degrade evaluation of the intrinsic signal of the cord. No definite pathologic enhancement is identified, however motion artifact is most severe on the postcontrast images. This does degrade detail significantly. No gross abnormality is identified in the paraspinal soft tissues within limits of the motion artifact. C2-C3: No herniation or stenosis. C3-C4: No herniation or stenosis. C4-C5: There has been progressive degenerative changes at this level with loss of disc height and progressive spondylosis with bulging of the disc on the left. Narrowing of the left neural foramen secondary to this is suspected. There is also left sided narrowing of the neural canal slight flattening of the cord. C5-C6: Progressive degenerative changes noted at this level with narrowing of the canal particularly laterally.  Axial imaging is markedly degraded by motion artifact but at least mild compression of the cord laterally on the left and the right suspected at this level. On the sagittal views the AP diameter of the canal at the disc level is 6.2 mm. Previously at this level AP diameter was 7.8 mm. C6-C7: Degenerative disc disease change is again noted at this level with some progression. There is no significant central stenosis. C7-T1: No herniation or stenosis. Today, she complains of neck and shoulder pain. She follows with Dr. Penny Ordonez, neurosurgery. She is doing physical therapy. She has numbness in her hands. She denies joint pain, swelling, or stiffness in her peripheral joints. She is currently on mesalamine 2.4 g daily. She is scheduled for a colonoscopy in 2020. She also complains of breathing issues from bronchitis and is on azithromycin and prednisone. Therapy History includes:  Current DMARD therapy includes: none  Prior DMARD therapy includes: none  The following DMARDs have been ineffective: none  The following DMARDs were stopped because of side effects: none    REVIEW OF SYSTEMS    A 15 point review of systems was performed and summarized below. The questionnaire was reviewed with the patient and scanned into the patient's medical record.     General: endorses fatigue, night sweats, denies recent weight gain, recent weight loss, weakness, fever  Musculoskeletal: endorses muscle pain, denies joint pain, morning stiffness,  joint swelling  Ears: endorses loss of hearing, denies ringing in ears, deafness  Eyes: denies pain, redness, loss of vision, double vision, blurred vision, dryness, foreign body sensation  Mouth: endorses loss of taste, dryness, denies sore tongue, oral ulcers, bleeding gums, increased dental caries  Nose: endorses loss of smell, denies nosebleeds, nasal ulcers  Throat: denies frequent sore throats, hoarseness, difficulty in swallowing, pain in jaw while chewing  Neck: denies swollen glands, tender glands  Cardiopulmonary: endorses wheezing, denies pain in chest, irregular heart beat, sudden changes in heart beat, shortness of breath, difficulty breathing at night, dry cough, productive cough, coughing of blood  Gastrointestinal: denies nausea, heartburn, stomach pain relieved by food, vomiting of blood/\"coffee grounds\", jaundice, increasing constipation, persistent diarrhea, blood in stools, black stools  Genitourinary: denies nocturia, difficult urination, pain or burning on urination, blood in urine, cloudy urine, pus in urine, genital discharge, frequent urination, vaginal dryness, rash/ulcers, sexual difficulties   Hematologic: denies anemia, bleeding tendency, blood clots  Skin: denies easy bruising, sun sensitive, rash, redness, hives, skin tightness, nodules/bumps, hair loss, color changes of hands or feet in the cold (Raynaud's)  Neurologic: endorses muscle weakness, numbness or tingling in hands, denies headaches, dizziness, memory loss  Psychiatric: denies depression, excessive worries, PTSD, Bipolar  Sleep: endorses snoring, denies poor sleep (7-8 hours), apnea, daytime somnolence, difficulty falling asleep, difficulty staying asleep     PAST MEDICAL HISTORY    She has a past medical history of Arthritis, Asthma, Bronchitis, Cerebral palsy ([address] Crohn's colitis ([address] Gastrointestinal disorder, Hypertension, Ill-defined condition, and Psychiatric disorder. FAMILY HISTORY    Her family history includes Breast Cancer in her cousin and maternal aunt; Cancer in her father; Crohn's Disease in her sister; Hypertension in her father and mother. SOCIAL HISTORY    She reports that she has never smoked. She has never used smokeless tobacco. She reports that she drinks alcohol. She reports that she does not use drugs.     GYNECOLOGIC HISTORY     2, Para 1, Living 1, Miscarriage 1    She denies severe pre-eclampsia, eclampsia or placental insufficiency    HEALTH MAINTENANCE    Immunizations  Immunization History   Administered Date(s) Administered    (RETIRED) Pneumococcal Vaccine (Unspecified Type) 08/03/2012       Age Appropriate Cancer Screening    Colonoscopy: 10/09/2014  PAP Smear: 9/15/2017  Mammogram: 1/07/2019    MEDICATIONS    Current Outpatient Medications   Medication Sig Dispense Refill    predniSONE (STERAPRED) 5 mg dose pack See administration instruction per 5mg dose pack 21 Tab 0    ibuprofen (MOTRIN) 400 mg tablet Take 1 Tab by mouth every eight (8) hours as needed for Pain. 30 Tab 0    loratadine (CLARITIN) 10 mg tablet Take 10 mg by mouth.  benzonatate (TESSALON) 100 mg capsule Take 100 mg by mouth three (3) times daily as needed for Cough.  azithromycin (ZITHROMAX Z-JENNIFER) 250 mg tablet 2 pills on Day #1; 1 pill PO daily on days 2-5. 6 Tab 0    hydroCHLOROthiazide (HYDRODIURIL) 25 mg tablet       montelukast (SINGULAIR) 10 mg tablet       FLOVENT  mcg/actuation inhaler       fluticasone (FLONASE) 50 mcg/actuation nasal spray       chlorpheniramine-HYDROcodone (TUSSIONEX PENNKINETIC ER) 10-8 mg/5 mL suspension Take 5 mL by mouth every twelve (12) hours as needed for Cough. Max Daily Amount: 10 mL. 60 mL 0    albuterol (PROVENTIL HFA, VENTOLIN HFA, PROAIR HFA) 90 mcg/actuation inhaler Take 1 Puff by inhalation every four (4) hours as needed. 1 Inhaler 0    Mesalamine (LIALDA) 1.2 gram delayed release tablet Take 2.4 g by mouth Daily (before breakfast).  fluoxetine (PROZAC) 20 mg capsule Take 20 mg by mouth daily. ALLERGIES    No Known Allergies    PHYSICAL EXAMINATION    Visit Vitals  /90   Pulse 92   Temp 97.7 °F (36.5 °C)   Resp 18   Ht 5' 1\" (1.549 m)   Wt 119 lb (54 kg)   BMI 22.48 kg/m²     Body mass index is 22.48 kg/m². General: Patient is alert, oriented x 3, not in acute distress,  at bedside    HEENT:   Conjunctiva are not injected and appear moist, oral mucous membranes are moist, there are no ulcers present, there is no alopecia, neck is supple, there is no lymphadenopathy.  Salivary glands are normal    Cardiovascular:  Heart is regular rate and rhythm, no murmurs. Chest:  Lungs are clear to auscultation bilaterally. Extremities:  Free of clubbing, cyanosis, edema, extremities well perfused. Neurological exam:  Muscle strength is full in upper and lower extremities. Skin exam:  There are no rashes, no tophi, no psoriasis, no active Raynaud's, no livedo reticularis, no periungual erythema. Musculoskeletal exam:  A comprehensive musculoskeletal exam was performed for all joints of each upper and lower extremity and assessed for swelling, tenderness and range of motion. Pertinent results are documented as below:    Decreased ROM of cervical spine   Surgical fixation of the RIGHT 3,rd 4th, 5th DIPs    Z-Deformities:   no  [place] Neck Deformities:  Yes (RIGHT 2nd)  Boutonierre's Deformities:  no  Ulnar Deviation:   no  MCP Subluxation:  no    Joint Count 9/4/2019   Patient pain (0-100) 100   MHAQ 0.25   Right wrist- Tender 0   Right wrist- Swollen 1   Right 5th MCP - Tender 0   Right 5th MCP - Swollen 1   Right 2nd PIP - Tender 0   Right 2nd PIP - Swollen 1   Tender Joint Count (Total) 0   Swollen Joint Count (Total) 3     DATA REVIEW    Prior medical records were reviewed and are summarized as below:    Laboratory data: summarized in the HPI    Imaging: summarized in the HPI. ASSESSMENT AND PLAN    1) Right Wrist Swelling. Her radiographs showed osteopenia in 10/2015. She denies joint symptoms. She is on prednisone for bronchitis and felt no different regarding her joints. Osteopenia on hand radiographs is suggestive for osteopenia/osteoporosis or chronic inflammation. She does have right wrist swelling and a swan neck deformity on her 2nd digit without tenderness. I reviewed her right hand radiograph in 10/2015. She was found to have normal/negative serologies.  With a history of C1 erosive synoviits which may be seen with Rheumatoid Arthritis or spondylitis and given her history of colitis, which may be associated with spondylitis, I believe it is worth while repeat her hand radiographs again. If there shows interval disease progression, then that would be a reason to treat although she is asymptomatic, since an anti-TNF may treat both ailments. 2) Chronic Neck Pain. Her most recent MRI did not show synovitis, so I defer to Dr. Isac Basurto. 3) Colitis. She is on mesalamine and is scheduled for a repeat colonoscopy in 2020. See #1. 4) Cognitive Dysfunction. This may be a limiting factor to her reporting peripheral joint pain. The patient voiced understanding of the aforementioned assessment and plan. Summary of plan was provided in the After Visit Summary patient instructions. I also provided education about MyChart setup and utility. TODAY'S ORDERS    Orders Placed This Encounter    XR HAND LT MIN 3 V    XR HAND RT MIN 3 V    XR WRIST RT AP/LAT     Future Appointments   Date Time Provider Chiquis Hernández   9/5/2019  2:00  [address] DEXA [address] IM   9/12/2019  1:00 PM Dhruv Temple MD [facility] [address] [place] [address]   1/8/2020 11:00 AM [facility] [address]  [address] [facility], [place], [address] [facility]    Adult Rheumatology   Rheumatology Ultrasound Certified  [address]  [facility], [address]   Phone [phone]  Fax [phone]

## 2019-09-04 NOTE — PATIENT INSTRUCTIONS
Please go to [facility] and ask for radiology department for x-rays    If your x-rays show damage from arthritis, I will prescribe you a medication. If your x-rays show no change compared to 10/2015 x-rays, I will not prescribe a medication. Please follow up with Dr. Anthony Guerrero for your neck.

## 2019-09-04 NOTE — LETTER
9/4/19 Patient: Nevin Frankel YOB: 1950 Date of Visit: 9/4/2019 Briana Tomas DO 
[id number] [facility] 7 [address] VIA Facsimile: [phone] Dear Briana Tomas DO, Thank you for referring Ms. Coral Rosen to [facility] for evaluation. My notes for this consultation are attached. If you have questions, please do not hesitate to call me. I look forward to following your patient along with you.  
 
 
Sincerely, 
 
Cristhian Tejada MD

## 2019-09-05 ENCOUNTER — HOSPITAL ENCOUNTER (OUTPATIENT)
Dept: BONE DENSITY | Age: 69
Discharge: HOME | End: 2019-09-05
Attending: FAMILY MEDICINE
Payer: MEDICARE

## 2019-09-05 DIAGNOSIS — M89.9 BONE DISEASE: ICD-10-CM

## 2019-09-05 PROCEDURE — 77080 DXA BONE DENSITY AXIAL: CPT

## 2019-09-12 ENCOUNTER — OFFICE VISIT (OUTPATIENT)
Dept: OBGYN CLINIC | Age: 69
End: 2019-09-12

## 2019-09-12 VITALS
BODY MASS INDEX: 23.03 KG/M2 | DIASTOLIC BLOOD PRESSURE: 84 MMHG | WEIGHT: 122 LBS | SYSTOLIC BLOOD PRESSURE: 126 MMHG | HEIGHT: 61 IN

## 2019-09-12 DIAGNOSIS — Z01.419 ENCOUNTER FOR GYNECOLOGICAL EXAMINATION WITHOUT ABNORMAL FINDING: Primary | ICD-10-CM

## 2019-09-12 NOTE — PROGRESS NOTES
Annual exam    Gabrielle Ramires is a 70 y.o. AAF with pmh of cerebral palsy  A1 presenting for annual exam. Patient without complaint today. Denies PMB. Reports occasional hot flashes, tolerable. Denies issues with vulvovaginal irritation/dryness or other concerns. Denies urinary incontinence. Denies pelvic pain or bloating. Mammogram and colonoscopy wnl and utd. She is taking VitD and Calcium for osteopenia. Pt lives with her  of 25 years. No other concerns today. Ob/Gyn Hx:   A1 - 1 vaginal delivery, 1 SAB  Menopause- unsure  ? VMS- denies  ? Vag dryness- denies  ? HRT- denies  STI- denies  ? SA- no    Health maintenance:  Pap- 17 NILM, no history of abnormal, no further paps indicated  Mammo- 19 B1  Colonoscopy- , normal repeat in 10 years  Dexa-19, osteopenic    Past Medical History:   Diagnosis Date    Arthritis     Asthma     Bronchitis     [address]bral palsy [address]     Crohn's colitis ([address]     Gastrointestinal disorder     constipation/hemmorhoids    Hypertension     Ill-defined condition     cerebral palsy    Psychiatric disorder     depression       Past Surgical History:   Procedure Laterality Date    HX ORTHOPAEDIC      R foot surgery    MD COLONOSCOPY W/BIOPSY SINGLE/MULTIPLE  2013         MD COLONOSCOPY W/BIOPSY SINGLE/MULTIPLE  10/9/2014         MD EGD TRANSORAL BIOPSY SINGLE/MULTIPLE  2013            Family History   Problem Relation Age of Onset    Hypertension Mother     Cancer Father         Bone cancer    Hypertension Father     Crohn's Disease Sister     Breast Cancer Maternal Aunt         age 63's    Breast Cancer Cousin         52's       Social History     Socioeconomic History    Marital status:      Spouse name: Not on file    Number of children: Not on file    Years of education: Not on file    Highest education level: Not on file   Occupational History    Not on file   Social Needs    Financial resource strain: Not on file    Food insecurity:     Worry: Not on file     Inability: Not on file    Transportation needs:     Medical: Not on file     Non-medical: Not on file   Tobacco Use    Smoking status: Never Smoker    Smokeless tobacco: Never Used   Substance and Sexual Activity    Alcohol use: Yes     Comment: rare    Drug use: No    Sexual activity: Not Currently   Lifestyle    Physical activity:     Days per week: Not on file     Minutes per session: Not on file    Stress: Not on file   Relationships    Social connections:     Talks on phone: Not on file     Gets together: Not on file     Attends Pent[employer]ostalism service: Not on file     Active member of club or organization: Not on file     Attends meetings of clubs or organizations: Not on file     Relationship status: Not on file    Intimate partner violence:     Fear of current or ex partner: Not on file     Emotionally abused: Not on file     Physically abused: Not on file     Forced sexual activity: Not on file   Other Topics Concern    Not on file   Social History Narrative    Not on file       Current Outpatient Medications   Medication Sig Dispense Refill    predniSONE (STERAPRED) 5 mg dose pack See administration instruction per 5mg dose pack 21 Tab 0    ibuprofen (MOTRIN) 400 mg tablet Take 1 Tab by mouth every eight (8) hours as needed for Pain. 30 Tab 0    loratadine (CLARITIN) 10 mg tablet Take 10 mg by mouth.  benzonatate (TESSALON) 100 mg capsule Take 100 mg by mouth three (3) times daily as needed for Cough.       azithromycin (ZITHROMAX Z-JENNIFER) 250 mg tablet 2 pills on Day #1; 1 pill PO daily on days 2-5. 6 Tab 0    hydroCHLOROthiazide (HYDRODIURIL) 25 mg tablet       montelukast (SINGULAIR) 10 mg tablet       FLOVENT  mcg/actuation inhaler       fluticasone (FLONASE) 50 mcg/actuation nasal spray       chlorpheniramine-HYDROcodone (TUSSIONEX PENNKINETIC ER) 10-8 mg/5 mL suspension Take 5 mL by mouth every twelve (12) hours as needed for Cough. Max Daily Amount: 10 mL. 60 mL 0    albuterol (PROVENTIL HFA, VENTOLIN HFA, PROAIR HFA) 90 mcg/actuation inhaler Take 1 Puff by inhalation every four (4) hours as needed. 1 Inhaler 0    Mesalamine (LIALDA) 1.2 gram delayed release tablet Take 2.4 g by mouth Daily (before breakfast).  fluoxetine (PROZAC) 20 mg capsule Take 20 mg by mouth daily.          No Known Allergies    Review of Systems - History obtained from the patient  Constitutional: negative for weight loss, fever, night sweats  HEENT: negative for hearing loss, earache, congestion, snoring, sorethroat  CV: negative for chest pain, palpitations, edema  Resp: negative for cough, shortness of breath, wheezing  GI: negative for change in bowel habits, abdominal pain, black or bloody stools  : negative for frequency, dysuria, hematuria, vaginal discharge  MSK: negative for back pain, joint pain, muscle pain  Breast: negative for breast lumps, nipple discharge, galactorrhea  Skin :negative for itching, rash, hives  Neuro: negative for dizziness, headache, confusion, weakness  Psych: negative for anxiety, depression, change in mood  Heme/lymph: negative for bleeding, bruising, pallor    Physical Exam  Visit Vitals  /84 (BP 1 Location: Left arm, BP Patient Position: Sitting)   Ht 5' 1\" (1.549 m)   Wt 122 lb (55.3 kg)   BMI 23.05 kg/m²     Constitutional  · Appearance: well-nourished, well developed, alert, in no acute distress, pleasant, ambulates without assistance    HENT  · Head and Face: appears normal, +uses hearing aid    Neck  · Inspection/Palpation: normal appearance, no masses or tenderness  · Lymph Nodes: no lymphadenopathy present  · Thyroid: gland size normal, nontender, no nodules or masses present on palpation    Chest  · Respiratory Effort: non-labored breathing  · Auscultation: CTAB, normal breath sounds    Cardiovascular  · Heart:  · Auscultation: regular rate and rhythm without murmur  · Extremities: no peripheral edema    Breasts  · Inspection of Breasts: breasts symmetrical, no skin changes, no discharge present, nipple appearance normal, no skin retraction present  · Palpation of Breasts and Axillae: no masses present on palpation, no breast tenderness  · Axillary Lymph Nodes: no lymphadenopathy present    Gastrointestinal  · Abdominal Examination: abdomen non-tender to palpation, normal bowel sounds, no masses present  · Liver and spleen: no hepatomegaly present, spleen not palpable  · Hernias: no hernias identified    Genitourinary  · External Genitalia: normal appearance for age, no discharge present, no tenderness present, no inflammatory lesions present, no masses present, +atrophy of UG mucosa  · Vagina: normal vaginal vault without central or paravaginal defects, no discharge present, no inflammatory lesions present, no masses present  · Bladder: non-tender to palpation  · Urethra: appears normal  · Cervix: normal   · Uterus: normal size, shape and consistency, small, mobile  · Adnexa: no adnexal tenderness present, no appreciable adnexal masses   · Perineum: perineum within normal limits, no evidence of trauma, no rashes or skin lesions present    Skin  · General Inspection: no rash, no lesions identified    Neurologic/Psychiatric  · Mental Status:  · Orientation: grossly oriented to person, place and time  · Mood and Affect: mood normal, affect appropriate      Assessment/Plan:  70 y.o. postmenopausal female presenting for annual exam. Overall doing well.      Health Maintenance:  -counseled re: diet, exercise, healthy lifestyle  -pap/HPV no longer indicated d/t age and h/o normal paps  -repeat mammo in 1 year  -colonoscopy wnl and utd  -dexa showing osteopenia --> cont calcium and vitD and weight bearing exercise    RTC: 1 year for AE or sooner prperry Chou MD  9/12/2019  1:28 PM

## 2019-09-12 NOTE — PATIENT INSTRUCTIONS

## 2019-09-27 ENCOUNTER — HOSPITAL ENCOUNTER (OUTPATIENT)
Dept: GENERAL RADIOLOGY | Age: 69
Discharge: HOME | End: 2019-09-27
Payer: MEDICARE

## 2019-09-27 DIAGNOSIS — M25.431 WRIST SWELLING, RIGHT: ICD-10-CM

## 2019-09-27 PROCEDURE — 73130 X-RAY EXAM OF HAND: CPT

## 2019-09-27 PROCEDURE — 73110 X-RAY EXAM OF WRIST: CPT

## 2019-10-08 ENCOUNTER — TELEPHONE (OUTPATIENT)
Dept: RHEUMATOLOGY | Age: 69
End: 2019-10-08

## 2019-10-08 NOTE — TELEPHONE ENCOUNTER
Spoke with pt who called asking about her xrays. I told her that no changes on her xrays was seen so therefore no medications are being prescribed at this time. She stated an understanding.

## 2019-10-11 ENCOUNTER — HOSPITAL ENCOUNTER (OUTPATIENT)
Dept: GENERAL RADIOLOGY | Age: 69
Discharge: HOME | End: 2019-10-11
Payer: MEDICARE

## 2019-10-11 DIAGNOSIS — R06.2 WHEEZING: ICD-10-CM

## 2019-10-11 DIAGNOSIS — J32.9 CHRONIC SINUSITIS, UNSPECIFIED LOCATION: ICD-10-CM

## 2019-10-11 DIAGNOSIS — R05.3 CHRONIC COUGH: ICD-10-CM

## 2019-10-11 PROCEDURE — 71046 X-RAY EXAM CHEST 2 VIEWS: CPT

## 2019-10-11 PROCEDURE — 70220 X-RAY EXAM OF SINUSES: CPT

## 2020-01-08 ENCOUNTER — HOSPITAL ENCOUNTER (OUTPATIENT)
Dept: MAMMOGRAPHY | Age: 70
Discharge: HOME | End: 2020-01-08
Attending: FAMILY MEDICINE
Payer: MEDICARE

## 2020-01-08 DIAGNOSIS — Z12.39 BREAST SCREENING: ICD-10-CM

## 2020-01-08 PROCEDURE — 77067 SCR MAMMO BI INCL CAD: CPT

## 2020-09-14 ENCOUNTER — OFFICE VISIT (OUTPATIENT)
Dept: OBGYN CLINIC | Age: 70
End: 2020-09-14
Payer: MEDICARE

## 2020-09-14 VITALS
SYSTOLIC BLOOD PRESSURE: 136 MMHG | BODY MASS INDEX: 23.41 KG/M2 | DIASTOLIC BLOOD PRESSURE: 88 MMHG | WEIGHT: 124 LBS | HEIGHT: 61 IN

## 2020-09-14 DIAGNOSIS — Z01.419 ENCOUNTER FOR GYNECOLOGICAL EXAMINATION WITHOUT ABNORMAL FINDING: Primary | ICD-10-CM

## 2020-09-14 PROCEDURE — G8420 CALC BMI NORM PARAMETERS: HCPCS | Performed by: OBSTETRICS & GYNECOLOGY

## 2020-09-14 PROCEDURE — G8752 SYS BP LESS 140: HCPCS | Performed by: OBSTETRICS & GYNECOLOGY

## 2020-09-14 PROCEDURE — G0101 CA SCREEN;PELVIC/BREAST EXAM: HCPCS | Performed by: OBSTETRICS & GYNECOLOGY

## 2020-09-14 PROCEDURE — G9899 SCRN MAM PERF RSLTS DOC: HCPCS | Performed by: OBSTETRICS & GYNECOLOGY

## 2020-09-14 PROCEDURE — G8754 DIAS BP LESS 90: HCPCS | Performed by: OBSTETRICS & GYNECOLOGY

## 2020-09-14 PROCEDURE — 1101F PT FALLS ASSESS-DOCD LE1/YR: CPT | Performed by: OBSTETRICS & GYNECOLOGY

## 2020-09-14 PROCEDURE — 3017F COLORECTAL CA SCREEN DOC REV: CPT | Performed by: OBSTETRICS & GYNECOLOGY

## 2020-09-14 PROCEDURE — 1090F PRES/ABSN URINE INCON ASSESS: CPT | Performed by: OBSTETRICS & GYNECOLOGY

## 2020-09-14 PROCEDURE — G9717 DOC PT DX DEP/BP F/U NT REQ: HCPCS | Performed by: OBSTETRICS & GYNECOLOGY

## 2020-09-14 RX ORDER — SULFASALAZINE 500 MG/1
TABLET, DELAYED RELEASE ORAL
COMMUNITY
Start: 2020-07-24

## 2020-09-14 RX ORDER — ALENDRONATE SODIUM 70 MG/1
TABLET ORAL
COMMUNITY
Start: 2020-09-02

## 2020-09-14 RX ORDER — BACLOFEN 10 MG/1
TABLET ORAL
COMMUNITY
Start: 2020-06-29 | End: 2021-02-18 | Stop reason: CLARIF

## 2020-09-14 NOTE — PATIENT INSTRUCTIONS
Pelvic Exam: Care Instructions  Your Care Instructions     When your doctor examines all of your pelvic organs, it's called a pelvic exam. Two good reasons to have this kind of exam are to check for sexually transmitted infections (STIs) and to get a Pap test. A Pap test is also called a Pap smear. It checks for early changes that can lead to cancer of the cervix. Sometimes a pelvic exam is part of a regular checkup. Your doctor may ask you to avoid vaginal sex, tampons, vaginal medicines, vaginal sprays or powders, and douching for 1 to 2 days before the test.  Other times, women have this kind of exam at any time of the month. This is because they have pelvic pain, bleeding, or discharge. Or they may have another pelvic problem. Before your exam, it's important to share some information with your doctor. For example, if you are a survivor of rape or sexual abuse, you can talk about any concerns you may have. Your doctor will also want to know if you are pregnant or use birth control. And he or she will want to hear about any problems, surgeries, or procedures you have had in your pelvic area. You will also need to tell your doctor when your last period was. Follow-up care is a key part of your treatment and safety. Be sure to make and go to all appointments, and call your doctor if you are having problems. It's also a good idea to know your test results and keep a list of the medicines you take. How is a pelvic exam done? · During a pelvic exam, you will:  ? Take off your clothes below the waist. You will get a paper or cloth cover to put over the lower half of your body. If this is regular checkup, you may undress completely and put on a gown. ? Lie on your back on an exam table. Your feet will be raised above you. Stirrups will support your feet. · The doctor will:  ? Ask you to relax your knees. Your knees need to lean out, toward the walls. ?  Check the opening of your vagina for sores or swelling. ? Gently put a tool called a speculum into your vagina. It opens the vagina a little bit. You will feel some pressure. But if you are relaxed, it will not hurt. It lets your doctor see inside the vagina. ? Use a small brush, spatula, or swab to get a sample of cells, if you are having a Pap test or culture. The doctor then removes the speculum. ? Put on gloves and put one or two fingers of one hand into your vagina. The other hand goes on your lower belly. This lets your doctor feel your pelvic organs. You will probably feel some pressure. Try to stay relaxed. ? Put one gloved finger into your rectum and one into your vagina, if needed. This can also help check your pelvic organs. This exam takes about 10 minutes. At the end, you will get a washcloth or tissue to clean your vaginal area. You can then get dressed. Why is a pelvic exam done? A pelvic exam may be done:  · As part of a woman's regular physical checkup. The exam may include a Pap test.  · To check for vaginal infection. · To check for sexually transmitted infections, such as chlamydia or herpes. · To help find the cause of abnormal uterine bleeding. · To look for problems like uterine fibroids, ovarian cysts, or uterine prolapse. · To find the cause of pelvic or belly pain. · Before inserting an intrauterine device (IUD) for birth control. · To collect evidence if you've been sexually assaulted. What are the risks of a pelvic exam?  There is a small chance that the doctor will find something on a pelvic exam that would not have caused a problem. This is called overdiagnosis. It could lead to tests or treatment you don't need. When should you call for help? Watch closely for changes in your health, and be sure to contact your doctor if you have any problems. Where can you learn more? Go to http://zainab-sherry.info/  Enter M421 in the search box to learn more about \"Pelvic Exam: Care Instructions. \"  Current as of: November 8, 2019               Content Version: 12.6  © 8906-9354 [employer]. Care instructions adapted under license by [employer] (which disclaims liability or warranty for this information). If you have questions about a medical condition or this instruction, always ask your healthcare professional. [address] any warranty or liability for your use of this information.

## 2020-09-14 NOTE — PROGRESS NOTES
Annual exam    Marquita Silva is a 78 y.o. AAF with pmh of cerebral palsy  A1 presenting for annual exam. Patient without complaint today. Denies PMB. Reports occasional hot flashes, tolerable. Denies issues with vulvovaginal irritation/dryness or other concerns. Denies urinary incontinence. Denies pelvic pain or bloating. Mammogram and colonoscopy wnl and utd. She is taking VitD and Calcium for osteopenia. Pt lives with her  of 25 years. No other concerns today. Ob/Gyn Hx:   A1 - 1 vaginal delivery, 1 SAB  Menopause- unsure  ? VMS- denies  ? Vag dryness- denies  ? HRT- denies  STI- denies  ? SA- no    Health maintenance:  Pap- 17 NILM, no history of abnormal, no further paps indicated  Mammo- 20 B1  Colonoscopy- , normal repeat in 10 years  Dexa-19, osteopenic    Past Medical History:   Diagnosis Date    Arthritis     Asthma     Bronchitis     Cerebral palsy ([address]     Crohn's colitis ([address]     Gastrointestinal disorder     constipation/hemmorhoids    Hypertension     Ill-defined condition     cerebral palsy    Psychiatric disorder     depression       Past Surgical History:   Procedure Laterality Date    HX ORTHOPAEDIC      R foot surgery    DE COLONOSCOPY W/BIOPSY SINGLE/MULTIPLE  2013         DE COLONOSCOPY W/BIOPSY SINGLE/MULTIPLE  10/9/2014         DE EGD TRANSORAL BIOPSY SINGLE/MULTIPLE  2013            Family History   Problem Relation Age of Onset    Hypertension Mother     Cancer Father         Bone cancer    Hypertension Father     Crohn's Disease Sister     Breast Cancer Maternal Aunt         age 63's    Breast Cancer Cousin         52's       Social History     Socioeconomic History    Marital status:      Spouse name: Not on file    Number of children: Not on file    Years of education: Not on file    Highest education level: Not on file   Occupational History    Not on file   Social Needs    Financial resource strain: Not on file    Food insecurity     Worry: Not on file     Inability: Not on file    Transportation needs     Medical: Not on file     Non-medical: Not on file   Tobacco Use    Smoking status: Never Smoker    Smokeless tobacco: Never Used   Substance and Sexual Activity    Alcohol use: Yes     Comment: rare    Drug use: No    Sexual activity: Not Currently   Lifestyle    Physical activity     Days per week: Not on file     Minutes per session: Not on file    Stress: Not on file   Relationships    Social connections     Talks on phone: Not on file     Gets together: Not on file     Attends Confucianist service: Not on file     Active member of club or organization: Not on file     Attends meetings of clubs or organizations: Not on file     Relationship status: Not on file    Intimate partner violence     Fear of current or ex partner: Not on file     Emotionally abused: Not on file     Physically abused: Not on file     Forced sexual activity: Not on file   Other Topics Concern    Not on file   Social History Narrative    Not on file       Current Outpatient Medications   Medication Sig Dispense Refill    alendronate (FOSAMAX) 70 mg tablet       baclofen (LIORESAL) 10 mg tablet TK 1 T PO TID WF OR MILK      sulfaSALAzine EC (AZULFIDINE) 500 mg EC tablet TK 2 TS PO BID FOR 90 DAYS.  hydroCHLOROthiazide (HYDRODIURIL) 25 mg tablet       montelukast (SINGULAIR) 10 mg tablet       fluoxetine (PROZAC) 20 mg capsule Take 20 mg by mouth daily.  predniSONE (STERAPRED) 5 mg dose pack See administration instruction per 5mg dose pack 21 Tab 0    ibuprofen (MOTRIN) 400 mg tablet Take 1 Tab by mouth every eight (8) hours as needed for Pain. 30 Tab 0    loratadine (CLARITIN) 10 mg tablet Take 10 mg by mouth.  benzonatate (TESSALON) 100 mg capsule Take 100 mg by mouth three (3) times daily as needed for Cough.       azithromycin (ZITHROMAX Z-JENNIFER) 250 mg tablet 2 pills on Day #1; 1 pill PO daily on days 2-5. 6 Tab 0    FLOVENT  mcg/actuation inhaler       fluticasone (FLONASE) 50 mcg/actuation nasal spray       chlorpheniramine-HYDROcodone (TUSSIONEX PENNKINETIC ER) 10-8 mg/5 mL suspension Take 5 mL by mouth every twelve (12) hours as needed for Cough. Max Daily Amount: 10 mL. 60 mL 0    albuterol (PROVENTIL HFA, VENTOLIN HFA, PROAIR HFA) 90 mcg/actuation inhaler Take 1 Puff by inhalation every four (4) hours as needed. 1 Inhaler 0    Mesalamine (LIALDA) 1.2 gram delayed release tablet Take 2.4 g by mouth Daily (before breakfast).          No Known Allergies    Review of Systems - History obtained from the patient  Constitutional: negative for weight loss, fever, night sweats  HEENT: negative for hearing loss, earache, congestion, snoring, sorethroat  CV: negative for chest pain, palpitations, edema  Resp: negative for cough, shortness of breath, wheezing  GI: negative for change in bowel habits, abdominal pain, black or bloody stools  : negative for frequency, dysuria, hematuria, vaginal discharge  MSK: negative for back pain, joint pain, muscle pain  Breast: negative for breast lumps, nipple discharge, galactorrhea  Skin :negative for itching, rash, hives  Neuro: negative for dizziness, headache, confusion, weakness  Psych: negative for anxiety, depression, change in mood  Heme/lymph: negative for bleeding, bruising, pallor    Physical Exam  Visit Vitals  /88 (BP 1 Location: Left arm, BP Patient Position: Sitting)   Ht 5' 1\" (1.549 m)   Wt 124 lb (56.2 kg)   BMI 23.43 kg/m²     Constitutional  · Appearance: well-nourished, well developed, alert, in no acute distress, pleasant, ambulates without assistance    HENT  · Head and Face: appears normal, +uses hearing aid    Neck  · Inspection/Palpation: normal appearance, no masses or tenderness  · Lymph Nodes: no lymphadenopathy present  · Thyroid: gland size normal, nontender, no nodules or masses present on palpation    Chest  · Respiratory Effort: non-labored breathing  · Auscultation: CTAB, normal breath sounds    Cardiovascular  · Heart:  · Auscultation: regular rate and rhythm without murmur  · Extremities: no peripheral edema    Breasts  · Inspection of Breasts: breasts symmetrical, no skin changes, no discharge present, nipple appearance normal, no skin retraction present  · Palpation of Breasts and Axillae: no masses present on palpation, no breast tenderness  · Axillary Lymph Nodes: no lymphadenopathy present    Gastrointestinal  · Abdominal Examination: abdomen non-tender to palpation, normal bowel sounds, no masses present  · Liver and spleen: no hepatomegaly present, spleen not palpable  · Hernias: no hernias identified    Genitourinary  · External Genitalia: normal appearance for age, no discharge present, no tenderness present, no inflammatory lesions present, no masses present, +atrophy of UG mucosa  · Vagina: normal vaginal vault without central or paravaginal defects, no discharge present, no inflammatory lesions present, no masses present  · Bladder: non-tender to palpation  · Urethra: appears normal  · Cervix: normal   · Uterus: normal size, shape and consistency, small, mobile  · Adnexa: no adnexal tenderness present, no appreciable adnexal masses   · Perineum: perineum within normal limits, no evidence of trauma, no rashes or skin lesions present    Skin  · General Inspection: no rash, no lesions identified    Neurologic/Psychiatric  · Mental Status:  · Orientation: grossly oriented to person, place and time  · Mood and Affect: mood normal, affect appropriate      Assessment/Plan:  78 y.o. postmenopausal female presenting for annual exam. Overall doing well.      Health Maintenance:  -counseled re: diet, exercise, healthy lifestyle  -pap/HPV no longer indicated d/t age and h/o normal paps  -repeat mammo in 1 year  -colonoscopy wnl and utd  -dexa showing osteopenia --> cont calcium and vitD and weight bearing exercise    RTC: 1 year for AE or sooner prn    Nicki Mccarthy MD  9/14/2020  2:52 PM

## 2020-11-17 ENCOUNTER — TRANSCRIBE ORDER (OUTPATIENT)
Dept: SCHEDULING | Age: 70
End: 2020-11-17

## 2020-11-17 DIAGNOSIS — Z12.31 VISIT FOR SCREENING MAMMOGRAM: Primary | ICD-10-CM

## 2021-01-20 ENCOUNTER — HOSPITAL ENCOUNTER (OUTPATIENT)
Dept: MAMMOGRAPHY | Age: 71
Discharge: HOME | End: 2021-01-20
Attending: FAMILY MEDICINE
Payer: COMMERCIAL

## 2021-01-20 DIAGNOSIS — Z12.31 VISIT FOR SCREENING MAMMOGRAM: ICD-10-CM

## 2021-01-20 PROCEDURE — 77067 SCR MAMMO BI INCL CAD: CPT

## 2021-01-26 ENCOUNTER — HOSPITAL ENCOUNTER (OUTPATIENT)
Dept: MAMMOGRAPHY | Age: 71
Discharge: HOME | End: 2021-01-26
Attending: FAMILY MEDICINE

## 2021-01-26 DIAGNOSIS — R92.8 ABNORMAL MAMMOGRAM: ICD-10-CM

## 2021-02-15 ENCOUNTER — HOSPITAL ENCOUNTER (OUTPATIENT)
Dept: PREADMISSION TESTING | Age: 71
Discharge: HOME | End: 2021-02-15
Payer: MEDICARE

## 2021-02-15 LAB — SARS-COV-2, COV2: NORMAL

## 2021-02-15 PROCEDURE — U0003 INFECTIOUS AGENT DETECTION BY NUCLEIC ACID (DNA OR RNA); SEVERE ACUTE RESPIRATORY SYNDROME CORONAVIRUS 2 (SARS-COV-2) (CORONAVIRUS DISEASE [COVID-19]), AMPLIFIED PROBE TECHNIQUE, MAKING USE OF HIGH THROUGHPUT TECHNOLOGIES AS DESCRIBED BY CMS-2020-01-R: HCPCS

## 2021-02-16 LAB — SARS-COV-2, COV2NT: NOT DETECTED

## 2021-02-19 ENCOUNTER — ANESTHESIA (OUTPATIENT)
Dept: ENDOSCOPY | Age: 71
End: 2021-02-19
Payer: MEDICARE

## 2021-02-19 ENCOUNTER — ANESTHESIA EVENT (OUTPATIENT)
Dept: ENDOSCOPY | Age: 71
End: 2021-02-19
Payer: MEDICARE

## 2021-02-19 ENCOUNTER — HOSPITAL ENCOUNTER (OUTPATIENT)
Age: 71
Setting detail: OUTPATIENT SURGERY
Discharge: HOME | End: 2021-02-19
Attending: INTERNAL MEDICINE | Admitting: INTERNAL MEDICINE
Payer: MEDICARE

## 2021-02-19 VITALS
DIASTOLIC BLOOD PRESSURE: 82 MMHG | RESPIRATION RATE: 19 BRPM | WEIGHT: 119 LBS | HEART RATE: 78 BPM | OXYGEN SATURATION: 94 % | SYSTOLIC BLOOD PRESSURE: 138 MMHG | HEIGHT: 61 IN | BODY MASS INDEX: 22.47 KG/M2 | TEMPERATURE: 98 F

## 2021-02-19 PROCEDURE — 76040000019: Performed by: INTERNAL MEDICINE

## 2021-02-19 PROCEDURE — 74011250636 HC RX REV CODE- 250/636: Performed by: INTERNAL MEDICINE

## 2021-02-19 PROCEDURE — 2709999900 HC NON-CHARGEABLE SUPPLY: Performed by: INTERNAL MEDICINE

## 2021-02-19 PROCEDURE — 77030021593 HC FCPS BIOP ENDOSC BSC -A: Performed by: INTERNAL MEDICINE

## 2021-02-19 PROCEDURE — 76060000031 HC ANESTHESIA FIRST 0.5 HR: Performed by: INTERNAL MEDICINE

## 2021-02-19 PROCEDURE — 74011250636 HC RX REV CODE- 250/636: Performed by: NURSE ANESTHETIST, CERTIFIED REGISTERED

## 2021-02-19 PROCEDURE — 88305 TISSUE EXAM BY PATHOLOGIST: CPT

## 2021-02-19 PROCEDURE — 77030013992 HC SNR POLYP ENDOSC BSC -B: Performed by: INTERNAL MEDICINE

## 2021-02-19 RX ORDER — PROPOFOL 10 MG/ML
INJECTION, EMULSION INTRAVENOUS AS NEEDED
Status: DISCONTINUED | OUTPATIENT
Start: 2021-02-19 | End: 2021-02-19 | Stop reason: HOSPADM

## 2021-02-19 RX ORDER — SODIUM CHLORIDE 0.9 % (FLUSH) 0.9 %
5-40 SYRINGE (ML) INJECTION AS NEEDED
Status: DISCONTINUED | OUTPATIENT
Start: 2021-02-19 | End: 2021-02-19 | Stop reason: HOSPADM

## 2021-02-19 RX ORDER — SODIUM CHLORIDE 9 MG/ML
75 INJECTION, SOLUTION INTRAVENOUS CONTINUOUS
Status: DISCONTINUED | OUTPATIENT
Start: 2021-02-19 | End: 2021-02-19 | Stop reason: HOSPADM

## 2021-02-19 RX ORDER — SODIUM CHLORIDE 0.9 % (FLUSH) 0.9 %
5-40 SYRINGE (ML) INJECTION EVERY 8 HOURS
Status: DISCONTINUED | OUTPATIENT
Start: 2021-02-19 | End: 2021-02-19 | Stop reason: HOSPADM

## 2021-02-19 RX ORDER — ATROPINE SULFATE 0.1 MG/ML
0.5 INJECTION INTRAVENOUS
Status: DISCONTINUED | OUTPATIENT
Start: 2021-02-19 | End: 2021-02-19 | Stop reason: HOSPADM

## 2021-02-19 RX ORDER — NALOXONE HYDROCHLORIDE 0.4 MG/ML
0.4 INJECTION, SOLUTION INTRAMUSCULAR; INTRAVENOUS; SUBCUTANEOUS
Status: DISCONTINUED | OUTPATIENT
Start: 2021-02-19 | End: 2021-02-19 | Stop reason: HOSPADM

## 2021-02-19 RX ORDER — DEXTROMETHORPHAN/PSEUDOEPHED 2.5-7.5/.8
1.2 DROPS ORAL
Status: DISCONTINUED | OUTPATIENT
Start: 2021-02-19 | End: 2021-02-19 | Stop reason: HOSPADM

## 2021-02-19 RX ORDER — FLUMAZENIL 0.1 MG/ML
0.2 INJECTION INTRAVENOUS
Status: DISCONTINUED | OUTPATIENT
Start: 2021-02-19 | End: 2021-02-19 | Stop reason: HOSPADM

## 2021-02-19 RX ORDER — EPINEPHRINE 0.1 MG/ML
1 INJECTION INTRACARDIAC; INTRAVENOUS
Status: DISCONTINUED | OUTPATIENT
Start: 2021-02-19 | End: 2021-02-19 | Stop reason: HOSPADM

## 2021-02-19 RX ADMIN — PROPOFOL 30 MG: 10 INJECTION, EMULSION INTRAVENOUS at 12:35

## 2021-02-19 RX ADMIN — PROPOFOL 50 MG: 10 INJECTION, EMULSION INTRAVENOUS at 12:29

## 2021-02-19 RX ADMIN — SODIUM CHLORIDE 75 ML/HR: 900 INJECTION, SOLUTION INTRAVENOUS at 11:44

## 2021-02-19 RX ADMIN — PROPOFOL 30 MG: 10 INJECTION, EMULSION INTRAVENOUS at 12:32

## 2021-02-19 RX ADMIN — PROPOFOL 30 MG: 10 INJECTION, EMULSION INTRAVENOUS at 12:39

## 2021-02-19 NOTE — DISCHARGE INSTRUCTIONS
Lalo Spears  [id number]  1950    COLON DISCHARGE INSTRUCTIONS  Discomfort:  Redness at IV site- apply warm compress to area; if redness or soreness persist- contact your physician  There may be a slight amount of blood passed from the rectum  Gaseous discomfort- walking, belching will help relieve any discomfort  You may not operate a vehicle for 12 hours  You may not engage in an occupation involving machinery or appliances for rest of today  You may not drink alcoholic beverages for at least 12 hours  Avoid making any critical decisions for at least 24 hour  DIET:   Regular diet. - however -  remember your colon is empty and a heavy meal will produce gas. Avoid these foods:  vegetables, fried / greasy foods, carbonated drinks for today  MEDICATION:  Per Medication Reconciliation       ACTIVITY:  You may not resume your normal daily activities until tomorrow AM; it is recommended that you spend the remainder of the day resting -  avoid any strenuous activity. CALL M.D. ANY SIGN OF:   Increasing pain, nausea, vomiting  Abdominal distension (swelling)  New increased bleeding (oral or rectal)  Fever (chills)  Pain in chest area  Bloody discharge from nose or mouth  Shortness of breath    You may not  take any Advil, Aspirin, Ibuprofen, Motrin, Aleve, or Goodys for 10 days, ONLY  Tylenol as needed for pain. IMPRESSION:  Impression:    1. 2 mm sessile polyp in cecum. Removed by cold biopsy polypectomy  2. 6 mm sessile polyp in transverse colon. Removed by cold snare polypectomy  3. Medium sized internal hemorrhoids seen on retroflexion  4. Otherwise normal ileo-colonoscopy. Biopsies taken of whole colon given history of ulcerative colitis    Recommendations:   1. Follow up pathology  2. Continue Sulfasalazine/FA  3.  Repeat colonoscopy in 5 years for surveillance    Follow-up Instructions:   Call Dr. Lele Cevallos for the results of procedure / biopsy in 7-10 days  Telephone # [phone]      Janeen Damon MD

## 2021-02-19 NOTE — PROCEDURES
NAME:  Chito Fonseca   :   1950   MRN:   [id number]     Date/Time:  2021 12:44 PM    Colonoscopy Operative Report    Procedure Type:   Colonoscopy with biopsy, polypectomy (cold snare), polypectomy (cold biopsy)     Indications:     Personal history of colon polyps (screening only), Ulcerative colitis  Pre-operative Diagnosis: see indication above  Post-operative Diagnosis:  See findings below  :  Beatrice Perez MD  Referring Provider: --Tae Manriquez MD    Exam:  Airway: clear, no airway problems anticipated  Heart: RRR, without gallops or rubs  Lungs: clear bilaterally without wheezes, crackles, or rhonchi  Abdomen: soft, nontender, nondistended, bowel sounds present  Mental Status: awake, alert and oriented to person, place and time    Sedation:  MAC anesthesia Propofol  Procedure Details:  After informed consent was obtained with all risks and benefits of procedure explained and preoperative exam completed, the patient was taken to the endoscopy suite and placed in the left lateral decubitus position. Upon sequential sedation as per above, a digital rectal exam was performed demonstrating internal hemorrhoids. The Olympus videocolonoscope  was inserted in the rectum and carefully advanced to the terminal ileum. The quality of preparation was good. The colonoscope was slowly withdrawn with careful evaluation between folds. Retroflexion in the rectum was completed demonstrating internal hemorrhoids. Findings:   1. 2 mm sessile polyp in cecum. Removed by cold biopsy polypectomy  2. 6 mm sessile polyp in transverse colon. Removed by cold snare polypectomy  3. Medium sized internal hemorrhoids seen on retroflexion  4. Otherwise normal ileo-colonoscopy. Biopsies taken of whole colon given history of ulcerative colitis    Specimen Removed:  1. Cecal polyp 2. Right colon 3. Transverse colon polyp 4. Left colon 5. Rectum  Complications: None. EBL:  None.     Impression:    1. 2 mm sessile polyp in cecum. Removed by cold biopsy polypectomy  2. 6 mm sessile polyp in transverse colon. Removed by cold snare polypectomy  3. Medium sized internal hemorrhoids seen on retroflexion  4. Otherwise normal ileo-colonoscopy. Biopsies taken of whole colon given history of ulcerative colitis    Recommendations:   1. Follow up pathology  2. Continue Sulfasalazine/FA  3. Repeat colonoscopy in 5 years for surveillance    Discharge Disposition:  Home in the company of a  when able to ambulate.       Wagner Beauchamp MD

## 2021-02-19 NOTE — ANESTHESIA PREPROCEDURE EVALUATION
Relevant Problems   No relevant active problems       Anesthetic History   No history of anesthetic complications            Review of Systems / Medical History  Patient summary reviewed and pertinent labs reviewed    Pulmonary            Asthma        Neuro/Psych         Psychiatric history     Cardiovascular    Hypertension              Exercise tolerance: >4 METS     GI/Hepatic/Renal  Within defined limits             Comments: Crohn's colitis Endo/Other        Arthritis     Other Findings   Comments: Cerebral palsy         Physical Exam    Airway  Mallampati: II  TM Distance: > 6 cm         Cardiovascular  Regular rate and rhythm,  S1 and S2 normal,  no murmur, click, rub, or gallop  Rhythm: regular  Rate: normal         Dental  No notable dental hx       Pulmonary  Breath sounds clear to auscultation               Abdominal  GI exam deferred       Other Findings            Anesthetic Plan    ASA: 2  Anesthesia type: MAC and total IV anesthesia          Induction: Intravenous  Anesthetic plan and risks discussed with: Patient

## 2021-02-19 NOTE — ANESTHESIA POSTPROCEDURE EVALUATION
Procedure(s):  COLONOSCOPY  COLON BIOPSY  ENDOSCOPIC POLYPECTOMY. MAC, total IV anesthesia    Anesthesia Post Evaluation      Multimodal analgesia: multimodal analgesia used between 6 hours prior to anesthesia start to PACU discharge  Patient location during evaluation: bedside  Patient participation: complete - patient participated  Level of consciousness: awake  Pain management: adequate  Airway patency: patent  Anesthetic complications: no  Cardiovascular status: acceptable  Respiratory status: acceptable  Hydration status: acceptable  Post anesthesia nausea and vomiting:  controlled  Final Post Anesthesia Temperature Assessment:  Normothermia (36.0-37.5 degrees C)      INITIAL Post-op Vital signs:   Vitals Value Taken Time   /81 02/19/21 1300   Temp     Pulse 80 02/19/21 1302   Resp 21 02/19/21 1302   SpO2 99 % 02/19/21 1302   Vitals shown include unvalidated device data.

## 2021-02-19 NOTE — ROUTINE PROCESS
Jose Ramon Hardeep 1950 
[id number] Situation: 
Verbal report received from: [place] Procedure: Procedure(s): 
COLONOSCOPY 
COLON BIOPSY ENDOSCOPIC POLYPECTOMY Background: 
 
Preoperative diagnosis: ULCERATIVE PROCTOSIGMOIDITIS Postoperative diagnosis: hemorrhoids,polyps :  Dr. Galindo Leal Assistant(s): Endoscopy Technician-1: Jamaal Ridley Endoscopy RN-1: Damaris Weir Specimens:  
ID Type Source Tests Collected by Time Destination 1 : cecal polyp Preservative Cecum  Mackenzie Jorgensen MD 2/19/2021 1235 Pathology 2 : right colon bx Preservative Colon  Mackenzie Jorgensen MD 2/19/2021 1236 Pathology 3 : transverse colon polyp Preservative Colon, Transverse  Mackenzie Jorgensen MD 2/19/2021 1239 Pathology 4 : left colon bx Preservative Colon  Mackenzie Jorgensen MD 2/19/2021 1240 Pathology 5 : rectum bx Preservative Rectum  Mackenzie Jorgensen MD 2/19/2021 1241 Pathology H. Pylori  no Assessment: 
Intra-procedure medications Anesthesia gave intra-procedure sedation and medications, see anesthesia flow sheet yes Intravenous fluids: NS@ Phillip Márquez Vital signs stable Abdominal assessment: round and soft Recommendation: 
Discharge patient per MD order. Family or Friend Permission to share finding with family or friend

## 2021-02-19 NOTE — H&P
Gastroenterology Outpatient History and Physical    Patient: Mela Peguero    Physician: Kirstin Rock MD    Chief Complaint: H/o UC and TA  History of Present Illness: No GI complaints    History:  Past Medical History:   Diagnosis Date    Arthritis     Asthma     Bronchitis     Cerebral palsy ([address]     Crohn's colitis ([address]     Gastrointestinal disorder     constipation/hemmorhoids    Hypertension     Ill-defined condition     cerebral palsy    Psychiatric disorder     depression      Past Surgical History:   Procedure Laterality Date    HX ORTHOPAEDIC      R foot surgery    NM COLONOSCOPY W/BIOPSY SINGLE/MULTIPLE  2/22/2013         NM COLONOSCOPY W/BIOPSY SINGLE/MULTIPLE  10/9/2014         NM EGD TRANSORAL BIOPSY SINGLE/MULTIPLE  2/22/2013           Social History     Socioeconomic History    Marital status:      Spouse name: Not on file    Number of children: Not on file    Years of education: Not on file    Highest education level: Not on file   Tobacco Use    Smoking status: Never Smoker    Smokeless tobacco: Never Used   Substance and Sexual Activity    Alcohol use: Yes     Comment: rare    Drug use: No    Sexual activity: Not Currently      Family History   Problem Relation Age of Onset    Hypertension Mother     Cancer Father         Bone cancer    Hypertension Father     Crohn's Disease Sister     Breast Cancer Maternal Aunt         age 63's    Breast Cancer Cousin         52's      Patient Active Problem List   Diagnosis Code    Depression F32.9    HTN (hypertension) I10    Ataxia R27.0    Left-sided weakness R53.1    Frequent falls R29.6    Cervical spinal stenosis M48.02    Cerebral palsy ([address] G80.9    Crohn's disease ([address] K50.90    Postmenopausal atrophic vaginitis N95.2    Menopausal and perimenopausal disorder N95.9       Allergies: No Known Allergies  Medications:   Prior to Admission medications    Medication Sig Start Date End Date Taking? Authorizing Provider   alendronate (FOSAMAX) 70 mg tablet  9/2/20  Yes Provider, Historical   loratadine (CLARITIN) 10 mg tablet Take 10 mg by mouth. Yes Other, MD Leticia   hydroCHLOROthiazide (HYDRODIURIL) 25 mg tablet  10/24/16  Yes Provider, Historical   montelukast (SINGULAIR) 10 mg tablet  12/5/16  Yes Provider, Historical   FLOVENT  mcg/actuation inhaler  2/4/16  Yes Provider, Historical   fluticasone (FLONASE) 50 mcg/actuation nasal spray  2/2/16  Yes Provider, Historical   albuterol (PROVENTIL HFA, VENTOLIN HFA, PROAIR HFA) 90 mcg/actuation inhaler Take 1 Puff by inhalation every four (4) hours as needed. 2/5/16  Yes Jose Cruz Jenkins MD   fluoxetine (PROZAC) 20 mg capsule Take 20 mg by mouth daily. Yes Other, MD Leticia   sulfaSALAzine EC (AZULFIDINE) 500 mg EC tablet TK 2 TS PO BID FOR 90 DAYS. 7/24/20   Provider, Historical   ibuprofen (MOTRIN) 400 mg tablet Take 1 Tab by mouth every eight (8) hours as needed for Pain. 6/14/19   Gabriel Malcolm MD   Mesalamine (LIALDA) 1.2 gram delayed release tablet Take 2.4 g by mouth Daily (before breakfast). Provider, Historical     Physical Exam:   Vital Signs: Blood pressure (!) 175/94, pulse (!) 105, temperature 98 °F (36.7 °C), resp. rate 22, height 5' 1\" (1.549 m), weight 54 kg (119 lb), SpO2 97 %, not currently breastfeeding.   General: well developed, well nourished   HEENT: unremarkable   Heart: regular rhythm no mumur    Lungs: clear   Abdominal:  benign   Neurological: unremarkable   Extremities: no edema     Findings/Diagnosis: UC and h/o TA  Plan of Care/Planned Procedure: Colonoscopy with conscious/deep sedation    Signed:  Justyna Dale MD 2/19/2021

## 2021-10-15 ENCOUNTER — OFFICE VISIT (OUTPATIENT)
Dept: OBGYN CLINIC | Age: 71
End: 2021-10-15
Payer: MEDICARE

## 2021-10-15 VITALS
SYSTOLIC BLOOD PRESSURE: 138 MMHG | BODY MASS INDEX: 22.47 KG/M2 | WEIGHT: 119 LBS | DIASTOLIC BLOOD PRESSURE: 78 MMHG | HEIGHT: 61 IN

## 2021-10-15 DIAGNOSIS — Z01.419 ENCOUNTER FOR GYNECOLOGICAL EXAMINATION WITHOUT ABNORMAL FINDING: Primary | ICD-10-CM

## 2021-10-15 PROCEDURE — 99397 PER PM REEVAL EST PAT 65+ YR: CPT | Performed by: OBSTETRICS & GYNECOLOGY

## 2021-10-15 PROCEDURE — G8420 CALC BMI NORM PARAMETERS: HCPCS | Performed by: OBSTETRICS & GYNECOLOGY

## 2021-10-15 PROCEDURE — G9899 SCRN MAM PERF RSLTS DOC: HCPCS | Performed by: OBSTETRICS & GYNECOLOGY

## 2021-10-15 PROCEDURE — 3017F COLORECTAL CA SCREEN DOC REV: CPT | Performed by: OBSTETRICS & GYNECOLOGY

## 2021-10-15 PROCEDURE — 1090F PRES/ABSN URINE INCON ASSESS: CPT | Performed by: OBSTETRICS & GYNECOLOGY

## 2021-10-15 PROCEDURE — G9717 DOC PT DX DEP/BP F/U NT REQ: HCPCS | Performed by: OBSTETRICS & GYNECOLOGY

## 2021-10-15 PROCEDURE — G8752 SYS BP LESS 140: HCPCS | Performed by: OBSTETRICS & GYNECOLOGY

## 2021-10-15 PROCEDURE — G8754 DIAS BP LESS 90: HCPCS | Performed by: OBSTETRICS & GYNECOLOGY

## 2021-10-15 PROCEDURE — 1101F PT FALLS ASSESS-DOCD LE1/YR: CPT | Performed by: OBSTETRICS & GYNECOLOGY

## 2021-10-15 NOTE — PROGRESS NOTES
Annual exam    Ellen Ann is a 69 y.o. AAF with pmh of cerebral palsy  A1 presenting for annual exam. Patient without complaint today. Denies PMB. Reports occasional hot flashes, tolerable. Denies issues with vulvovaginal irritation/dryness or other concerns. Denies urinary incontinence. Denies pelvic pain or bloating. Mammogram and colonoscopy wnl and utd. She is taking VitD and Calcium for osteopenia. Pt lives with her  of 25 years. No other concerns today. Ob/Gyn Hx:   A1 - 1 vaginal delivery, 1 SAB  Menopause- unsure  ? VMS- denies  ? Vag dryness- denies  ? HRT- denies  STI- denies  ? SA- no    Health maintenance:  Pap- 17 NILM, no history of abnormal, no further paps indicated  Mammo- 21 B1  Colonoscopy- 21, repeat in 5 years  Dexa-19, osteopenic    Past Medical History:   Diagnosis Date    Arthritis     Asthma     Bronchitis     [address]bral palsy ([address]     [facility]     Gastrointestinal disorder     constipation/hemmorhoids    Hypertension     Ill-defined condition     cerebral palsy    Psychiatric disorder     depression       Past Surgical History:   Procedure Laterality Date    COLONOSCOPY N/A 2021    COL[facility]OSCOPY performed by [facility] at [facility] ENDOSCOPY    HX ORTHOPAEDIC      R foot surgery    GA COLONOSCOPY W/BIOPSY SINGLE/MULTIPLE  2013         GA COLONOSCOPY W/BIOPSY SINGLE/MULTIPLE  10/9/2014         GA EGD TRANSORAL BIOPSY SINGLE/MULTIPLE  2013            Family History   Problem Relation Age of Onset    Hypertension Mother     Cancer Father         Bone cancer    Hypertension Father     Crohn's Disease Sister     Breast Cancer Maternal Aunt         age 63's   [employer] Breast Cancer Cousin         52's       Social History     Socioeconomic History    Marital status:      Spouse name: Not on file    Number of children: Not on file    Years of education: Not on file    Highest education level: Not on file   Occupational History    Not on file   Tobacco Use    Smoking status: Never Smoker    Smokeless tobacco: Never Used   Substance and Sexual Activity    Alcohol use: Yes     Comment: rare    Drug use: No    Sexual activity: Not Currently   Other Topics Concern    Not on file   Social History Narrative    Not on file     Social Determinants of Health     Financial Resource Strain:     Difficulty of Paying Living Expenses:    Food Insecurity:     Worried About Running Out of Food in the Last Year:     [address] in the Last Year:    Transportation Needs:     Lack of Transportation (Medical):  Lack of Transportation (Non-Medical):    Physical Activity:     Days of Exercise per Week:     Minutes of Exercise per Session:    Stress:     Feeling of Stress :    Social Connections:     Frequency of Communication with Friends and Family:     Frequency of Social Gatherings with Friends and Family:     Attends Taoist Services:     Active Member of Clubs or Organizations:     Attends Club or Organization Meetings:     Marital Status:    Intimate Partner Violence:     Fear of Current or Ex-Partner:     Emotionally Abused:     Physically Abused:     Sexually Abused:        Current Outpatient Medications   Medication Sig Dispense Refill    alendronate (FOSAMAX) 70 mg tablet       sulfaSALAzine EC (AZULFIDINE) 500 mg EC tablet TK 2 TS PO BID FOR 90 DAYS.  ibuprofen (MOTRIN) 400 mg tablet Take 1 Tab by mouth every eight (8) hours as needed for Pain. 30 Tab 0    loratadine (CLARITIN) 10 mg tablet Take 10 mg by mouth.  hydroCHLOROthiazide (HYDRODIURIL) 25 mg tablet       montelukast (SINGULAIR) 10 mg tablet       FLOVENT  mcg/actuation inhaler       fluticasone (FLONASE) 50 mcg/actuation nasal spray       albuterol (PROVENTIL HFA, VENTOLIN HFA, PROAIR HFA) 90 mcg/actuation inhaler Take 1 Puff by inhalation every four (4) hours as needed.  1 Inhaler 0    Mesalamine (LIALDA) 1.2 gram delayed release tablet Take 2.4 g by mouth Daily (before breakfast).  fluoxetine (PROZAC) 20 mg capsule Take 20 mg by mouth daily.          No Known Allergies    Review of Systems - History obtained from the patient  Constitutional: negative for weight loss, fever, night sweats  HEENT: negative for hearing loss, earache, congestion, snoring, sorethroat  CV: negative for chest pain, palpitations, edema  Resp: negative for cough, shortness of breath, wheezing  GI: negative for change in bowel habits, abdominal pain, black or bloody stools  : negative for frequency, dysuria, hematuria, vaginal discharge  MSK: negative for back pain, joint pain, muscle pain  Breast: negative for breast lumps, nipple discharge, galactorrhea  Skin :negative for itching, rash, hives  Neuro: negative for dizziness, headache, confusion, weakness  Psych: negative for anxiety, depression, change in mood  Heme/lymph: negative for bleeding, bruising, pallor    Physical Exam  Visit Vitals  /78   Ht 5' 1\" (1.549 m)   Wt 119 lb (54 kg)   LMP  (LMP Unknown)   BMI 22.48 kg/m²       Constitutional  · Appearance: well-nourished, well developed, alert, in no acute distress, pleasant, ambulates without assistance    HENT  · Head and Face: appears normal, +uses hearing aid    Neck  · Inspection/Palpation: normal appearance, no masses or tenderness  · Lymph Nodes: no lymphadenopathy present  · Thyroid: gland size normal, nontender, no nodules or masses present on palpation    Chest  · Respiratory Effort: non-labored breathing  · Auscultation: CTAB, normal breath sounds    Cardiovascular  · Heart:  · Auscultation: regular rate and rhythm without murmur  · Extremities: no peripheral edema    Breasts  · Inspection of Breasts: breasts symmetrical, no skin changes, no discharge present, nipple appearance normal, no skin retraction present  · Palpation of Breasts and Axillae: no masses present on palpation, no breast tenderness  · Axillary Lymph Nodes: no lymphadenopathy present    Gastrointestinal  · Abdominal Examination: abdomen non-tender to palpation, normal bowel sounds, no masses present  · Liver and spleen: no hepatomegaly present, spleen not palpable  · Hernias: no hernias identified    Genitourinary  · External Genitalia: normal appearance for age, no discharge present, no tenderness present, no inflammatory lesions present, no masses present, +atrophy of UG mucosa  · Vagina: normal vaginal vault without central or paravaginal defects, no discharge present, no inflammatory lesions present, no masses present  · Bladder: non-tender to palpation  · Urethra: appears normal  · Cervix: normal   · Uterus: normal size, shape and consistency, small, mobile  · Adnexa: no adnexal tenderness present, no appreciable adnexal masses   · Perineum: perineum within normal limits, no evidence of trauma, no rashes or skin lesions present    Skin  · General Inspection: no rash, no lesions identified    Neurologic/Psychiatric  · Mental Status:  · Orientation: grossly oriented to person, place and time  · Mood and Affect: mood normal, affect appropriate      Assessment/Plan:  69 y.o. postmenopausal female presenting for annual exam. Overall doing well.      Health Maintenance:  -counseled re: diet, exercise, healthy lifestyle  -pap/HPV no longer indicated d/t age and h/o normal paps  -mammogram referral provided  -colonoscopy wnl and utd  -dexa showing osteopenia --> cont calcium and vitD and weight bearing exercise    RTC: 1 year for AE or sooner torres Marshall MD  10/15/2021  11:01 AM

## 2021-10-15 NOTE — PATIENT INSTRUCTIONS
Pelvic Exam: Care Instructions  Overview     When your doctor examines your pelvic organs, it's called a pelvic exam. This exam is done to evaluate symptoms, such as pelvic pain or abnormal vaginal bleeding and discharge. It may also be done to collect samples of cells for cervical cancer screening. Before your exam, it's important to share some information with your doctor. You can talk about any concerns you may have. Your doctor will also want to know if you are pregnant or use birth control. And your doctor will want to hear about any problems, surgeries, or procedures you have had in your pelvic area. You will also need to tell your doctor when your last period was. Follow-up care is a key part of your treatment and safety. Be sure to make and go to all appointments, and call your doctor if you are having problems. It's also a good idea to know your test results and keep a list of the medicines you take. How is a pelvic exam done? · During a pelvic exam, you will:  ? Take off your clothes below the waist. You will get a paper or cloth cover to put over the lower half of your body. ? Lie on your back on an exam table with your feet and legs supported by footrests. · The doctor may:  ? Ask you to relax your knees. Your knees need to lean out, toward the walls. ? Check the opening of your vagina for sores or swelling. ? Gently put a tool called a speculum into your vagina. It opens the vagina a little bit. You may feel some pressure. The speculum lets your doctor see inside the vagina. ? Use a small brush, spatula, or swab to get a sample for testing. The doctor then removes the speculum. ? Put on gloves and put one or two fingers of one hand into your vagina. The other hand goes on your lower belly. This lets your doctor feel your pelvic organs. You will probably feel some pressure. ? Put one gloved finger into your rectum and one into your vagina, if needed.  This can also help check your pelvic organs. You may have a small amount of vaginal discharge or bleeding after the exam.  Why is a pelvic exam done? A pelvic exam may be done:  · To collect samples of cells for cervical cancer screening. · To check for vaginal infection. · To check for sexually transmitted infections, such as chlamydia or herpes. · To help find the cause of abnormal uterine bleeding. · To look for problems like uterine fibroids, ovarian cysts, or uterine prolapse. · To help find the cause of pelvic or belly pain. · Before inserting an intrauterine device (IUD). · To collect evidence if you've been sexually assaulted. What are the risks of a pelvic exam?  There is a small chance that the doctor will find something on a pelvic exam that would not have caused a problem. This is called overdiagnosis. It could lead to tests or treatment you don't need. When should you call for help? Watch closely for changes in your health, and be sure to contact your doctor if you have any problems. Where can you learn more? Go to http://www.gray.com/  Enter M421 in the search box to learn more about \"Pelvic Exam: Care Instructions. \"  Current as of: February 11, 2021               Content Version: 13.0  © 3811-7951 [employer]. Care instructions adapted under license by [employer] (which disclaims liability or warranty for this information). If you have questions about a medical condition or this instruction, always ask your healthcare professional. [place] [address] any warranty or liability for your use of this information.

## 2021-10-28 ENCOUNTER — TRANSCRIBE ORDER (OUTPATIENT)
Dept: SCHEDULING | Age: 71
End: 2021-10-28

## 2021-10-28 DIAGNOSIS — Z12.31 VISIT FOR SCREENING MAMMOGRAM: Primary | ICD-10-CM

## 2022-01-21 ENCOUNTER — HOSPITAL ENCOUNTER (OUTPATIENT)
Dept: MAMMOGRAPHY | Age: 72
Discharge: HOME | End: 2022-01-21
Attending: FAMILY MEDICINE
Payer: MEDICARE

## 2022-01-21 DIAGNOSIS — Z12.31 VISIT FOR SCREENING MAMMOGRAM: ICD-10-CM

## 2022-01-21 PROCEDURE — 77067 SCR MAMMO BI INCL CAD: CPT

## 2022-03-19 PROBLEM — N95.2 POSTMENOPAUSAL ATROPHIC VAGINITIS: Status: ACTIVE | Noted: 2017-09-15

## 2022-03-19 PROBLEM — N95.9 MENOPAUSAL AND PERIMENOPAUSAL DISORDER: Status: ACTIVE | Noted: 2017-09-15

## 2022-09-20 ENCOUNTER — TRANSCRIBE ORDER (OUTPATIENT)
Dept: SCHEDULING | Age: 72
End: 2022-09-20

## 2022-09-20 DIAGNOSIS — Z12.31 SCREENING MAMMOGRAM FOR HIGH-RISK PATIENT: Primary | ICD-10-CM

## 2023-03-31 ENCOUNTER — HOSPITAL ENCOUNTER (OUTPATIENT)
Dept: MAMMOGRAPHY | Age: 73
Discharge: HOME | End: 2023-03-31
Attending: FAMILY MEDICINE
Payer: MEDICARE

## 2023-03-31 DIAGNOSIS — Z12.31 SCREENING MAMMOGRAM FOR HIGH-RISK PATIENT: ICD-10-CM

## 2023-03-31 PROCEDURE — 77063 BREAST TOMOSYNTHESIS BI: CPT

## 2023-12-29 ENCOUNTER — TRANSCRIBE ORDERS (OUTPATIENT)
Facility: HOSPITAL | Age: 73
End: 2023-12-29

## 2023-12-29 DIAGNOSIS — Z12.31 SCREENING MAMMOGRAM FOR HIGH-RISK PATIENT: Primary | ICD-10-CM

## 2024-07-30 ENCOUNTER — OFFICE VISIT (OUTPATIENT)
Age: 74
End: 2024-07-30

## 2024-07-30 VITALS — BODY MASS INDEX: 22.07 KG/M2 | SYSTOLIC BLOOD PRESSURE: 118 MMHG | DIASTOLIC BLOOD PRESSURE: 80 MMHG | WEIGHT: 116.8 LBS

## 2024-07-30 DIAGNOSIS — Z01.419 ENCOUNTER FOR GYNECOLOGICAL EXAMINATION WITHOUT ABNORMAL FINDING: ICD-10-CM

## 2024-07-30 DIAGNOSIS — Z12.31 ENCOUNTER FOR SCREENING MAMMOGRAM FOR MALIGNANT NEOPLASM OF BREAST: Primary | ICD-10-CM

## 2024-07-30 NOTE — PROGRESS NOTES
Chief Complaint   Patient presents with    Annual Exam     Ching Odom is a 73 y.o. AAF with pmh of cerebral palsy  A1 presenting for annual exam.  Her main concerns today include: stool incontinence?     03/15/24 she reports bleeding with BM-- went to ER, had colonoscopy in April colitis flare up.  The patient reports stool incontinence since colitis flare up.    From Dr. Ramirez's last note, 10/15/21: Denies PMB. Reports occasional hot flashes, tolerable. Denies issues with vulvovaginal irritation/dryness or other concerns. Denies urinary incontinence. Denies pelvic pain or bloating. Mammogram and colonoscopy wnl and utd. She is taking VitD and Calcium for osteopenia. Pt lives with her  of 24 years.     Ob/Gyn Hx:   A1 - 1 vaginal delivery, 1 SAB  Menopause- unsure  VB?- none  ?HRT- no  STI- denies  ?SA- Not currently     Health maintenance:  Pap- 17 NILM, no history of abnormal, no further paps indicated  Mammo- Ordered today, 23 B1,21 B1  Colonoscopy- 21, repeat in 5 years  Dexa- 19, osteopenic     1. Have you been to the ER, urgent care clinic, or hospitalized since your last visit?No    2. Have you seen or consulted any other health care providers outside of the [facility] since your last visit? Yes, [facility] and [facility]  Mary Hernandez LPN  
small, mobile  Adnexa: no adnexal tenderness present, no adnexal masses present  Perineum: perineum within normal limits, no evidence of trauma, no rashes or skin lesions present    Skin  General Inspection: no rash, no lesions identified    Neurologic/Psychiatric  Mental Status:  Orientation: grossly oriented to person, place and time  Mood and Affect: mood normal, affect appropriate      Assessment/Plan:  73 y.o. postmenopausal female with CP presenting for annual exam.     Health Maintenance:  -diet, exercise, healthy lifestyle  -pap no longer indicated  -declines STI testing  -mammo referral provided  -colonoscopy utd  -dexa scan repeat per PCP recs, on fosamax, and reports taking calcium and vitD    RTC: for WWE or sooner law Ramirez MD  7/30/2024  1:15 PM

## 2024-11-08 ENCOUNTER — TELEPHONE (OUTPATIENT)
Age: 74
End: 2024-11-08

## 2024-11-08 NOTE — TELEPHONE ENCOUNTER
James pt   Patient called in, name and  verified. Patient is calling as she reports she was given medication for her colonoscopy due to a colitis flare up. She reports that now she is having bowels movement secretions coming out of her vagina. She wanted to know if she should follow up with us here. I have confirmed with the pt that she did INDEED mean she had bowel coming form her vagina. She confirmed that statement.    I have encouraged the pt to be seen in the ER as this should NOT be happening. Pt has expressed understanding. Please advise.

## 2024-11-12 ENCOUNTER — OFFICE VISIT (OUTPATIENT)
Age: 74
End: 2024-11-12
Payer: MEDICARE

## 2024-11-12 VITALS
DIASTOLIC BLOOD PRESSURE: 78 MMHG | TEMPERATURE: 98.2 F | HEIGHT: 61 IN | BODY MASS INDEX: 21.71 KG/M2 | RESPIRATION RATE: 17 BRPM | WEIGHT: 115 LBS | SYSTOLIC BLOOD PRESSURE: 139 MMHG | OXYGEN SATURATION: 96 % | HEART RATE: 75 BPM

## 2024-11-12 DIAGNOSIS — R15.9 INCONTINENCE OF FECES, UNSPECIFIED FECAL INCONTINENCE TYPE: Primary | ICD-10-CM

## 2024-11-12 PROCEDURE — G8399 PT W/DXA RESULTS DOCUMENT: HCPCS | Performed by: OBSTETRICS & GYNECOLOGY

## 2024-11-12 PROCEDURE — 99213 OFFICE O/P EST LOW 20 MIN: CPT | Performed by: OBSTETRICS & GYNECOLOGY

## 2024-11-12 PROCEDURE — 1159F MED LIST DOCD IN RCRD: CPT | Performed by: OBSTETRICS & GYNECOLOGY

## 2024-11-12 PROCEDURE — 1090F PRES/ABSN URINE INCON ASSESS: CPT | Performed by: OBSTETRICS & GYNECOLOGY

## 2024-11-12 PROCEDURE — G8427 DOCREV CUR MEDS BY ELIG CLIN: HCPCS | Performed by: OBSTETRICS & GYNECOLOGY

## 2024-11-12 PROCEDURE — 3075F SYST BP GE 130 - 139MM HG: CPT | Performed by: OBSTETRICS & GYNECOLOGY

## 2024-11-12 PROCEDURE — 1160F RVW MEDS BY RX/DR IN RCRD: CPT | Performed by: OBSTETRICS & GYNECOLOGY

## 2024-11-12 PROCEDURE — G8420 CALC BMI NORM PARAMETERS: HCPCS | Performed by: OBSTETRICS & GYNECOLOGY

## 2024-11-12 PROCEDURE — 1123F ACP DISCUSS/DSCN MKR DOCD: CPT | Performed by: OBSTETRICS & GYNECOLOGY

## 2024-11-12 PROCEDURE — 3078F DIAST BP <80 MM HG: CPT | Performed by: OBSTETRICS & GYNECOLOGY

## 2024-11-12 PROCEDURE — G8484 FLU IMMUNIZE NO ADMIN: HCPCS | Performed by: OBSTETRICS & GYNECOLOGY

## 2024-11-12 PROCEDURE — 1126F AMNT PAIN NOTED NONE PRSNT: CPT | Performed by: OBSTETRICS & GYNECOLOGY

## 2024-11-12 PROCEDURE — 1036F TOBACCO NON-USER: CPT | Performed by: OBSTETRICS & GYNECOLOGY

## 2024-11-12 PROCEDURE — 3017F COLORECTAL CA SCREEN DOC REV: CPT | Performed by: OBSTETRICS & GYNECOLOGY

## 2024-11-12 SDOH — ECONOMIC STABILITY: FOOD INSECURITY: WITHIN THE PAST 12 MONTHS, THE FOOD YOU BOUGHT JUST DIDN'T LAST AND YOU DIDN'T HAVE MONEY TO GET MORE.: SOMETIMES TRUE

## 2024-11-12 SDOH — ECONOMIC STABILITY: INCOME INSECURITY: HOW HARD IS IT FOR YOU TO PAY FOR THE VERY BASICS LIKE FOOD, HOUSING, MEDICAL CARE, AND HEATING?: NOT VERY HARD

## 2024-11-12 SDOH — ECONOMIC STABILITY: FOOD INSECURITY: WITHIN THE PAST 12 MONTHS, YOU WORRIED THAT YOUR FOOD WOULD RUN OUT BEFORE YOU GOT MONEY TO BUY MORE.: SOMETIMES TRUE

## 2024-11-12 NOTE — PROGRESS NOTES
Ching Odom is a 73 y.o. female presents for a problem visit.  Her main concern is stool coming out of her vagina.    She called the office on 24 reported having 'bowel movement secretions coming out of her vagina'.  Encouraged to go to ER for Dr. Masters, then advised should then follow up with GI and Dr. Ramirez.  This problem started the week before last.    Does not have a GI doctor she sees regularly aside from when she has colonoscopies    The patient was seen at Urgent Care on 24 for this issue.  Treated for bacterial vaginosis and colitis.  Given Rx Flagyl and Terconazole cream.    Chief Complaint   Patient presents with    Stool per vagina     Ob/Gyn Hx:   A1 - 1 vaginal delivery, 1 SAB  Menopause- unsure  ?VMS- denies  ?HRT- denies  STI- denies  ?SA- no     Health maintenance:  Pap- 17 NILM, no history of abnormal, no further paps indicated  Mammo- DUE, 23 B1,21 B1  Colonoscopy- 21, repeat in 5 years  Dexa- 19, osteopenic, on fosamax    1. Have you been to the ER, urgent care clinic, or hospitalized since your last visit? Yes Urgent Care 24 treated for bacterial vaginosis and colitis    2. Have you seen or consulted any other health care providers outside of the [facility] since your last visit? Yes see above  Mary Hernandez LPN  2024  10:41 AM

## 2024-11-12 NOTE — PROGRESS NOTES
Problem Visit    Ching Odom is a 73 y.o. AAF with pmh of cerebral palsy  A1 presenting for concerns of liquid stool in vaginal area with wiping recently. Pt expressed similar concerns at her last visit several months ago.     Denies vaginal itching, irritation or odor.     She was seen at urgent care and was empirically treated for BV and yeast with flagyl and terconazole, but she has not yet taken these treatments as she wanted to check with GYN first.    Has also been diagnosed with colitis in the past. Last colonoscopy . Has not recently seen GI.     Denies PMB. Reports occasional hot flashes, tolerable.     Pt lives with her  of > 25 years, not sexually active. Pt on disability.    Ob/Gyn Hx:   A1 - 1 vaginal delivery, 1 SAB  Menopause- unsure  ?VMS- denies  ?HRT- denies  STI- denies  ?SA- no     Health maintenance:  Pap- 17 NILM, no history of abnormal, no further paps indicated  Mammo- DUE, 23 B1,21 B1  Colonoscopy- 21, repeat in 5 years  Dexa- 19, osteopenic, on fosamax     Past Medical History:   Diagnosis Date    Arthritis     Asthma     Bronchitis     Cerebral palsy (HCC)     Crohn's colitis (HCC)     Gastrointestinal disorder     constipation/hemmorhoids    Hypertension     Ill-defined condition     cerebral palsy    Psychiatric disorder     depression       Past Surgical History:   Procedure Laterality Date    COLONOSCOPY N/A 2021    COLONOSCOPY performed by Cisco Collier MD at [facility] ENDOSCOPY    COLONOSCOPY W/BIOPSY SINGLE/MULTIPLE  2013         COLONOSCOPY W/BIOPSY SINGLE/MULTIPLE  10/9/2014         EGD TRANSORAL BIOPSY SINGLE/MULTIPLE  2013         ORTHOPEDIC SURGERY      R foot surgery       Family History   Problem Relation Age of Onset    Breast Cancer Cousin         50's    Breast Cancer Maternal Aunt         age 60's    Crohn's Disease Sister     Hypertension Father     Cancer Father         Bone cancer    Hypertension Mother

## 2025-02-20 ENCOUNTER — ANESTHESIA EVENT (OUTPATIENT)
Facility: HOSPITAL | Age: 75
End: 2025-02-20
Payer: MEDICARE

## 2025-02-20 ENCOUNTER — ANESTHESIA (OUTPATIENT)
Facility: HOSPITAL | Age: 75
End: 2025-02-20
Payer: MEDICARE

## 2025-02-20 ENCOUNTER — HOSPITAL ENCOUNTER (OUTPATIENT)
Facility: HOSPITAL | Age: 75
Setting detail: OUTPATIENT SURGERY
Discharge: HOME | End: 2025-02-20
Attending: INTERNAL MEDICINE | Admitting: INTERNAL MEDICINE
Payer: MEDICARE

## 2025-02-20 VITALS
OXYGEN SATURATION: 96 % | HEART RATE: 76 BPM | TEMPERATURE: 98.2 F | RESPIRATION RATE: 16 BRPM | DIASTOLIC BLOOD PRESSURE: 91 MMHG | BODY MASS INDEX: 22.78 KG/M2 | HEIGHT: 60 IN | SYSTOLIC BLOOD PRESSURE: 136 MMHG | WEIGHT: 116 LBS

## 2025-02-20 PROCEDURE — 3600007512: Performed by: INTERNAL MEDICINE

## 2025-02-20 PROCEDURE — 7100000011 HC PHASE II RECOVERY - ADDTL 15 MIN: Performed by: INTERNAL MEDICINE

## 2025-02-20 PROCEDURE — 2709999900 HC NON-CHARGEABLE SUPPLY: Performed by: INTERNAL MEDICINE

## 2025-02-20 PROCEDURE — 3700000001 HC ADD 15 MINUTES (ANESTHESIA): Performed by: INTERNAL MEDICINE

## 2025-02-20 PROCEDURE — 3600007502: Performed by: INTERNAL MEDICINE

## 2025-02-20 PROCEDURE — 7100000010 HC PHASE II RECOVERY - FIRST 15 MIN: Performed by: INTERNAL MEDICINE

## 2025-02-20 PROCEDURE — 6360000002 HC RX W HCPCS

## 2025-02-20 PROCEDURE — 2580000003 HC RX 258

## 2025-02-20 PROCEDURE — 88305 TISSUE EXAM BY PATHOLOGIST: CPT

## 2025-02-20 PROCEDURE — 3700000000 HC ANESTHESIA ATTENDED CARE: Performed by: INTERNAL MEDICINE

## 2025-02-20 RX ORDER — SODIUM CHLORIDE 9 MG/ML
INJECTION, SOLUTION INTRAVENOUS PRN
Status: DISCONTINUED | OUTPATIENT
Start: 2025-02-20 | End: 2025-02-20 | Stop reason: HOSPADM

## 2025-02-20 RX ORDER — SODIUM CHLORIDE 0.9 % (FLUSH) 0.9 %
5-40 SYRINGE (ML) INJECTION PRN
Status: DISCONTINUED | OUTPATIENT
Start: 2025-02-20 | End: 2025-02-20 | Stop reason: HOSPADM

## 2025-02-20 RX ORDER — SODIUM CHLORIDE 9 MG/ML
INJECTION, SOLUTION INTRAVENOUS
Status: DISCONTINUED | OUTPATIENT
Start: 2025-02-20 | End: 2025-02-20 | Stop reason: SDUPTHER

## 2025-02-20 RX ORDER — PHENYLEPHRINE HCL IN 0.9% NACL 0.4MG/10ML
SYRINGE (ML) INTRAVENOUS
Status: DISCONTINUED | OUTPATIENT
Start: 2025-02-20 | End: 2025-02-20 | Stop reason: SDUPTHER

## 2025-02-20 RX ORDER — SODIUM CHLORIDE 0.9 % (FLUSH) 0.9 %
5-40 SYRINGE (ML) INJECTION EVERY 12 HOURS SCHEDULED
Status: DISCONTINUED | OUTPATIENT
Start: 2025-02-20 | End: 2025-02-20 | Stop reason: HOSPADM

## 2025-02-20 RX ORDER — LIDOCAINE HYDROCHLORIDE 20 MG/ML
INJECTION, SOLUTION EPIDURAL; INFILTRATION; INTRACAUDAL; PERINEURAL
Status: DISCONTINUED | OUTPATIENT
Start: 2025-02-20 | End: 2025-02-20 | Stop reason: SDUPTHER

## 2025-02-20 RX ADMIN — Medication 40 MCG: at 11:22

## 2025-02-20 RX ADMIN — Medication 40 MCG: at 11:18

## 2025-02-20 RX ADMIN — LIDOCAINE HYDROCHLORIDE 50 MG: 20 INJECTION, SOLUTION EPIDURAL; INFILTRATION; INTRACAUDAL; PERINEURAL at 11:06

## 2025-02-20 RX ADMIN — SODIUM CHLORIDE: 9 INJECTION, SOLUTION INTRAVENOUS at 11:03

## 2025-02-20 RX ADMIN — Medication 80 MCG: at 11:10

## 2025-02-20 RX ADMIN — PROPOFOL 20 MG: 10 INJECTION, EMULSION INTRAVENOUS at 11:13

## 2025-02-20 RX ADMIN — PROPOFOL 30 MG: 10 INJECTION, EMULSION INTRAVENOUS at 11:08

## 2025-02-20 RX ADMIN — PROPOFOL 30 MG: 10 INJECTION, EMULSION INTRAVENOUS at 11:10

## 2025-02-20 RX ADMIN — PROPOFOL 50 MG: 10 INJECTION, EMULSION INTRAVENOUS at 11:06

## 2025-02-20 RX ADMIN — PROPOFOL 20 MG: 10 INJECTION, EMULSION INTRAVENOUS at 11:18

## 2025-02-20 RX ADMIN — PROPOFOL 20 MG: 10 INJECTION, EMULSION INTRAVENOUS at 11:16

## 2025-02-20 ASSESSMENT — PAIN - FUNCTIONAL ASSESSMENT: PAIN_FUNCTIONAL_ASSESSMENT: 0-10

## 2025-02-20 NOTE — ANESTHESIA PRE PROCEDURE
Department of Anesthesiology  Preprocedure Note       Name:  Ching Odom   Age:  74 y.o.  :  1950                                          MRN:  [id number]         Date:  2025      Surgeon: Surgeon(s):  Cisco Collier MD    Procedure: Procedure(s):  COLONOSCOPY DIAGNOSTIC    Medications prior to admission:   Prior to Admission medications    Medication Sig Start Date End Date Taking? Authorizing Provider   FOLIC ACID PO Take 1 tablet by mouth daily   Yes Haider Chester MD   albuterol sulfate HFA (PROVENTIL;VENTOLIN;PROAIR) 108 (90 Base) MCG/ACT inhaler Inhale 1 puff into the lungs every 4 hours as needed 16  Yes Automatic Reconciliation, Ar   FLUoxetine (PROZAC) 20 MG capsule Take 1 capsule by mouth daily   Yes Automatic Reconciliation, Ar   fluticasone (FLOVENT HFA) 110 MCG/ACT inhaler Inhale 2 puffs into the lungs 2 times daily 16  Yes Automatic Reconciliation, Ar   hydroCHLOROthiazide (HYDRODIURIL) 25 MG tablet Take 1 tablet by mouth daily 10/24/16  Yes Automatic Reconciliation, Ar   montelukast (SINGULAIR) 10 MG tablet Take 1 tablet by mouth daily 16  Yes Automatic Reconciliation, Ar   Cetirizine HCl (ZYRTEC PO) Take by mouth  Patient not taking: Reported on 2024    Haider Chester MD   CALCIUM PO Take by mouth  Patient not taking: Reported on 2024    Haider Chester MD   BENZONATATE PO Take by mouth  Patient not taking: Reported on 2025    Haider Chester MD   alendronate (FOSAMAX) 70 MG tablet ceived the following from [facility]: Outside name: alendronate (FOSAMAX) 70 mg tablet  Patient not taking: Reported on 2024   Automatic Reconciliation, Ar   fluticasone (FLONASE) 50 MCG/ACT nasal spray ceived the following from [facility]: Outside name: fluticasone (FLONASE) 50 mcg/actuation nasal spray  Patient not taking: Reported on 2024   Automatic Reconciliation, Ar   ibuprofen

## 2025-02-20 NOTE — H&P
Gastroenterology Outpatient History and Physical    Patient: Ching Odom    Physician: Cisco Collier MD    Chief Complaint: UC  History of Present Illness: Diarrhea    History:  Past Medical History:   Diagnosis Date    Arthritis     Asthma     Bronchitis     Cerebral palsy (HCC)     Crohn's colitis (HCC)     Gastrointestinal disorder     constipation/hemmorhoids    Hypertension     Psychiatric disorder     depression      Past Surgical History:   Procedure Laterality Date    COLONOSCOPY N/A 2/19/2021    COLONOSCOPY performed by Cisco Collier MD at [facility] ENDOSCOPY    COLONOSCOPY W/BIOPSY SINGLE/MULTIPLE  2/22/2013         COLONOSCOPY W/BIOPSY SINGLE/MULTIPLE  10/9/2014         EGD TRANSORAL BIOPSY SINGLE/MULTIPLE  2/22/2013         ORTHOPEDIC SURGERY      R foot surgery      Social History     Socioeconomic History    Marital status:      Spouse name: None    Number of children: None    Years of education: None    Highest education level: None   Tobacco Use    Smoking status: Never    Smokeless tobacco: Never   Vaping Use    Vaping status: Never Used   Substance and Sexual Activity    Alcohol use: Yes    Drug use: No    Sexual activity: Not Currently     Social Determinants of Health     Financial Resource Strain: Low Risk  (11/12/2024)    Overall Financial Resource Strain (CARDIA)     Difficulty of Paying Living Expenses: Not very hard   Food Insecurity: Food Insecurity Present (11/12/2024)    Hunger Vital Sign     Worried About Running Out of Food in the Last Year: Sometimes true     Ran Out of Food in the Last Year: Sometimes true   Transportation Needs: Unknown (11/12/2024)    PRAPARE - Transportation     Lack of Transportation (Non-Medical): No   Housing Stability: Unknown (11/12/2024)    Housing Stability Vital Sign     Homeless in the Last Year: No      Family History   Problem Relation Age of Onset    Breast Cancer Cousin         50's    Breast Cancer Maternal Aunt         age 60's

## 2025-02-20 NOTE — PROGRESS NOTES
Endoscopy Case End Note:    11:21:  Procedure scope was pre-cleaned, per protocol, at bedside by Luke.      11:21:  Report received from anesthesia - [facility].  See anesthesia flowsheet for intra-procedure vital signs and events.

## 2025-02-20 NOTE — OP NOTE
NAME:  Ching Odom   :   1950   MRN:   [id number]     Date/Time:  2025 11:24 AM    Colonoscopy Operative Report    Procedure Type:   Colonoscopy with biopsy     Indications:     Personal history of colon polyps (screening only), Ulcerative colitis  Pre-operative Diagnosis: see indication above  Post-operative Diagnosis:  See findings below  :  Cisco Collier MD  Referring Provider: --Abe Danielle MD    Exam:  Airway: clear, no airway problems anticipated  Heart: RRR, without gallops or rubs  Lungs: clear bilaterally without wheezes, crackles, or rhonchi  Abdomen: soft, nontender, nondistended, bowel sounds present  Mental Status: awake, alert and oriented to person, place and time    Sedation:  MAC anesthesia Propofol  Procedure Details:  After informed consent was obtained with all risks and benefits of procedure explained and preoperative exam completed, the patient was taken to the endoscopy suite and placed in the left lateral decubitus position.  Upon sequential sedation as per above, a digital rectal exam was performed demonstrating internal hemorrhoids.  The Olympus videocolonoscope  was inserted in the rectum and carefully advanced to the terminal ileum.   The quality of preparation was good.  The colonoscope was slowly withdrawn with careful evaluation between folds. Retroflexion in the rectum was completed demonstrating internal hemorrhoids.     Findings:   Mild to moderate proctitis secondary to Ulcerative colitis in distal rectum, up to 7 cm proximal from anal verge  Small internal hemorrhoids  Otherwise normal ileo-colonoscopy. Biopsies taken of terminal ileum and whole colon    Specimen Removed:  1. Terminal ileum 2. Right colon 3. Left colon 4. Rectum  Complications: None.   EBL:  None.    Impression:   Mild to moderate proctitis secondary to Ulcerative colitis in distal rectum, up to 7 cm proximal from anal verge  Small internal hemorrhoids  Otherwise normal

## 2025-02-20 NOTE — PERIOP NOTE
ARRIVAL INFORMATION:  Verified patient name and date of birth, scheduled procedure, and informed consent.     : Oliver tariq contact number: [phone]  Physician and staff can share information with the .     Receive texts: yes    Belongings with patient include:  Clothing,Glasses, purse  , hearing aids, upper denture     GI FOCUSED ASSESSMENT:  Neuro: Awake, alert, oriented x4  Respiratory: even and unlabored   GI: soft and non-distended  EKG Rhythm: normal sinus rhythm    Education:Reviewed general discharge instructions and  information.

## 2025-02-20 NOTE — ANESTHESIA POSTPROCEDURE EVALUATION
Department of Anesthesiology  Postprocedure Note    Patient: Ching Odom  MRN: [id number]  YOB: 1950  Date of evaluation: 2/20/2025    Procedure Summary       Date: 02/20/25 Room / Location: [facility] ENDO 03 / [facility] ENDOSCOPY    Anesthesia Start: 1103 Anesthesia Stop: 1123    Procedure: COLONOSCOPY DIAGNOSTIC (Lower GI Region) Diagnosis:       Chronic ulcerative rectosigmoiditis with complication (HCC)      Personal history of adenomatous and serrated colon polyps      Diarrhea, unspecified type      Blood in the stool      (Chronic ulcerative rectosigmoiditis with complication (HCC) [K51.319])      (Personal history of adenomatous and serrated colon polyps [Z86.0101])      (Diarrhea, unspecified type [R19.7])      (Blood in the stool [K92.1])    Surgeons: Cisco Collier MD Responsible Provider: Ron Van MD    Anesthesia Type: MAC ASA Status: 2            Anesthesia Type: MAC    Terrell Phase I: Terrell Score: 10    Terrell Phase II: Terrell Score: 10    Anesthesia Post Evaluation    Patient location during evaluation: PACU  Patient participation: complete - patient participated  Level of consciousness: sleepy but conscious and responsive to verbal stimuli  Pain score: 1  Airway patency: patent  Nausea & Vomiting: no vomiting and no nausea  Cardiovascular status: blood pressure returned to baseline and hemodynamically stable  Respiratory status: acceptable  Hydration status: stable  Multimodal analgesia pain management approach  Pain management: adequate    No notable events documented.

## 2025-02-20 NOTE — DISCHARGE INSTRUCTIONS
Ching RICK Odom  [id number]  1950    COLON DISCHARGE INSTRUCTIONS  Discomfort:  Redness at IV site- apply warm compress to area; if redness or soreness persist- contact your physician  There may be a slight amount of blood passed from the rectum  Gaseous discomfort- walking, belching will help relieve any discomfort  You may not operate a vehicle for 12 hours  You may not engage in an occupation involving machinery or appliances for rest of today  You may not drink alcoholic beverages for at least 12 hours  Avoid making any critical decisions for at least 24 hour  DIET:   Regular diet.   - however -  remember your colon is empty and a heavy meal will produce gas.   Avoid these foods:  vegetables, fried / greasy foods, carbonated drinks for today  MEDICATION:  [unfilled]     ACTIVITY:  You may not resume your normal daily activities until tomorrow AM; it is recommended that you spend the remainder of the day resting -  avoid any strenuous activity.  CALL M.D.  ANY SIGN OF:   Increasing pain, nausea, vomiting  Abdominal distension (swelling)  New increased bleeding (oral or rectal)  Fever (chills)  Pain in chest area  Bloody discharge from nose or mouth  Shortness of breath    You may not  take any Advil, Aspirin, Ibuprofen, Motrin, Aleve, or Goody’s for 10 days, ONLY  Tylenol as needed for pain.    IMPRESSION:  Impression:   Mild to moderate proctitis secondary to Ulcerative colitis in distal rectum, up to 7 cm proximal from anal verge  Small internal hemorrhoids  Otherwise normal ileo-colonoscopy. Biopsies taken of terminal ileum and whole colon    Recommendations:   Follow up pathology  Continue Sulfasalazine/Folic acid  Mesalamine suppositories qhs x 30 days to treat with proctitis (will send prescription electronically)  Repeat colonoscopy in 5 years for surveillance    Follow-up Instructions:   Call Dr. Collier for the results of procedure / biopsy in 7-10 days  Telephone # [phone]    Cisco Collier,

## 2025-08-13 ENCOUNTER — OFFICE VISIT (OUTPATIENT)
Age: 75
End: 2025-08-13

## 2025-08-13 VITALS
TEMPERATURE: 98.5 F | SYSTOLIC BLOOD PRESSURE: 120 MMHG | BODY MASS INDEX: 24.16 KG/M2 | OXYGEN SATURATION: 100 % | WEIGHT: 123.7 LBS | HEART RATE: 91 BPM | RESPIRATION RATE: 17 BRPM | DIASTOLIC BLOOD PRESSURE: 70 MMHG

## 2025-08-13 DIAGNOSIS — M25.551 BILATERAL HIP PAIN: Primary | ICD-10-CM

## 2025-08-13 DIAGNOSIS — M25.552 BILATERAL HIP PAIN: Primary | ICD-10-CM

## 2025-08-13 RX ORDER — MELOXICAM 7.5 MG/1
7.5 TABLET ORAL DAILY
Qty: 10 TABLET | Refills: 0 | Status: SHIPPED | OUTPATIENT
Start: 2025-08-13 | End: 2025-08-23

## (undated) DEVICE — CONTAINER SPEC 20 ML LID NEUT BUFF FORMALIN 10 % POLYPR STS

## (undated) DEVICE — SYR 10ML LUER LOK 1/5ML GRAD --

## (undated) DEVICE — FORCEPS BX L240CM JAW DIA2.4MM WRK CHN 2.8MM ORNG L CAP W/

## (undated) DEVICE — Z DISCONTINUED PER MEDLINE LINE GAS SAMPLING O2/CO2 LNG AD 13 FT NSL W/ TBNG FILTERLINE

## (undated) DEVICE — 1200 GUARD II KIT W/5MM TUBE W/O VAC TUBE: Brand: GUARDIAN

## (undated) DEVICE — Device

## (undated) DEVICE — CATH IV AUTOGRD BC BLU 22GA 25 -- INSYTE

## (undated) DEVICE — TRAP,MUCUS SPECIMEN, 80CC: Brand: MEDLINE

## (undated) DEVICE — ELECTRODE,RADIOTRANSLUCENT,FOAM,5PK: Brand: MEDLINE

## (undated) DEVICE — CUFF BLD PRSS AD CLTH SGL TB W/ BAYNT CONN ROUNDED CORNER

## (undated) DEVICE — NEEDLE HYPO 18GA L1.5IN PNK S STL HUB POLYPR SHLD REG BVL

## (undated) DEVICE — SNARE ENDOSCP M L240CM W27MM SHTH DIA2.4MM CHN 2.8MM OVL

## (undated) DEVICE — SET ADMIN 16ML TBNG L100IN 2 Y INJ SITE IV PIGGY BK DISP

## (undated) DEVICE — IV START KIT: Brand: MEDLINE

## (undated) DEVICE — SET GRAV CK VLV NEEDLESS ST 3 GANGED 4WAY STPCOCK HI FLO 10

## (undated) DEVICE — ENDOSCOPIC KIT COMPLIANCE ENDOKIT

## (undated) DEVICE — SOLIDIFIER FLD 2OZ 1500CC N DISINF IN BTL DISP SAFESORB

## (undated) DEVICE — SYR 3ML LL TIP 1/10ML GRAD --

## (undated) DEVICE — STRAINER URIN CALC RNL MSH -- CONVERT TO ITEM 357634

## (undated) DEVICE — BASIN EMSIS 16OZ GRAPHITE PLAS KID SHP MOLD GRAD FOR ORAL

## (undated) DEVICE — BAG SPEC BIOHZRD 10 X 10 IN --

## (undated) DEVICE — TOWEL 4 PLY TISS 19X30 SUE WHT

## (undated) DEVICE — NEONATAL-ADULT SPO2 SENSOR: Brand: NELLCOR

## (undated) DEVICE — FORCEPS BX L240CM JAW DIA2.8MM L CAP W/ NDL MIC MESH TOOTH

## (undated) DEVICE — NON-REM POLYHESIVE PATIENT RETURN ELECTRODE: Brand: VALLEYLAB